# Patient Record
Sex: MALE | HISPANIC OR LATINO | Employment: FULL TIME | URBAN - METROPOLITAN AREA
[De-identification: names, ages, dates, MRNs, and addresses within clinical notes are randomized per-mention and may not be internally consistent; named-entity substitution may affect disease eponyms.]

---

## 2017-12-16 ENCOUNTER — APPOINTMENT (OUTPATIENT)
Dept: RADIOLOGY | Facility: MEDICAL CENTER | Age: 42
DRG: 854 | End: 2017-12-16
Attending: EMERGENCY MEDICINE

## 2017-12-16 ENCOUNTER — HOSPITAL ENCOUNTER (INPATIENT)
Facility: MEDICAL CENTER | Age: 42
LOS: 1 days | DRG: 854 | End: 2017-12-18
Attending: EMERGENCY MEDICINE | Admitting: HOSPITALIST

## 2017-12-16 DIAGNOSIS — K61.1 ABSCESS, PERIRECTAL: ICD-10-CM

## 2017-12-16 DIAGNOSIS — L03.317 CELLULITIS OF BUTTOCK: ICD-10-CM

## 2017-12-16 LAB
ALBUMIN SERPL BCP-MCNC: 3.9 G/DL (ref 3.2–4.9)
ALBUMIN/GLOB SERPL: 0.9 G/DL
ALP SERPL-CCNC: 139 U/L (ref 30–99)
ALT SERPL-CCNC: 55 U/L (ref 2–50)
AMORPH CRY #/AREA URNS HPF: PRESENT /HPF
ANION GAP SERPL CALC-SCNC: 11 MMOL/L (ref 0–11.9)
APPEARANCE UR: CLEAR
AST SERPL-CCNC: 76 U/L (ref 12–45)
BACTERIA #/AREA URNS HPF: NEGATIVE /HPF
BASOPHILS # BLD AUTO: 0.2 % (ref 0–1.8)
BASOPHILS # BLD: 0.03 K/UL (ref 0–0.12)
BILIRUB SERPL-MCNC: 1.2 MG/DL (ref 0.1–1.5)
BILIRUB UR QL STRIP.AUTO: NEGATIVE
BLOOD CULTURE HOLD CXBCH: NORMAL
BUN SERPL-MCNC: 10 MG/DL (ref 8–22)
CALCIUM SERPL-MCNC: 8.7 MG/DL (ref 8.5–10.5)
CHLORIDE SERPL-SCNC: 99 MMOL/L (ref 96–112)
CO2 SERPL-SCNC: 23 MMOL/L (ref 20–33)
COLOR UR: YELLOW
CREAT SERPL-MCNC: 1.08 MG/DL (ref 0.5–1.4)
EOSINOPHIL # BLD AUTO: 0.03 K/UL (ref 0–0.51)
EOSINOPHIL NFR BLD: 0.2 % (ref 0–6.9)
EPI CELLS #/AREA URNS HPF: NEGATIVE /HPF
ERYTHROCYTE [DISTWIDTH] IN BLOOD BY AUTOMATED COUNT: 43.8 FL (ref 35.9–50)
FLUAV RNA SPEC QL NAA+PROBE: NEGATIVE
FLUBV RNA SPEC QL NAA+PROBE: NEGATIVE
GFR SERPL CREATININE-BSD FRML MDRD: >60 ML/MIN/1.73 M 2
GLOBULIN SER CALC-MCNC: 4.4 G/DL (ref 1.9–3.5)
GLUCOSE SERPL-MCNC: 107 MG/DL (ref 65–99)
GLUCOSE UR STRIP.AUTO-MCNC: NEGATIVE MG/DL
HCT VFR BLD AUTO: 40.4 % (ref 42–52)
HGB BLD-MCNC: 13.5 G/DL (ref 14–18)
HYALINE CASTS #/AREA URNS LPF: ABNORMAL /LPF
IMM GRANULOCYTES # BLD AUTO: 0.02 K/UL (ref 0–0.11)
IMM GRANULOCYTES NFR BLD AUTO: 0.2 % (ref 0–0.9)
KETONES UR STRIP.AUTO-MCNC: NEGATIVE MG/DL
LACTATE BLD-SCNC: 1 MMOL/L (ref 0.5–2)
LACTATE BLD-SCNC: 2.5 MMOL/L (ref 0.5–2)
LEUKOCYTE ESTERASE UR QL STRIP.AUTO: ABNORMAL
LYMPHOCYTES # BLD AUTO: 2.42 K/UL (ref 1–4.8)
LYMPHOCYTES NFR BLD: 19.5 % (ref 22–41)
MCH RBC QN AUTO: 28.3 PG (ref 27–33)
MCHC RBC AUTO-ENTMCNC: 33.4 G/DL (ref 33.7–35.3)
MCV RBC AUTO: 84.7 FL (ref 81.4–97.8)
MICRO URNS: ABNORMAL
MONOCYTES # BLD AUTO: 1.05 K/UL (ref 0–0.85)
MONOCYTES NFR BLD AUTO: 8.5 % (ref 0–13.4)
NEUTROPHILS # BLD AUTO: 8.84 K/UL (ref 1.82–7.42)
NEUTROPHILS NFR BLD: 71.4 % (ref 44–72)
NITRITE UR QL STRIP.AUTO: NEGATIVE
NRBC # BLD AUTO: 0 K/UL
NRBC BLD AUTO-RTO: 0 /100 WBC
PH UR STRIP.AUTO: 7.5 [PH]
PLATELET # BLD AUTO: 195 K/UL (ref 164–446)
PMV BLD AUTO: 10.3 FL (ref 9–12.9)
POTASSIUM SERPL-SCNC: 3.5 MMOL/L (ref 3.6–5.5)
PROT SERPL-MCNC: 8.3 G/DL (ref 6–8.2)
PROT UR QL STRIP: NEGATIVE MG/DL
RBC # BLD AUTO: 4.77 M/UL (ref 4.7–6.1)
RBC # URNS HPF: ABNORMAL /HPF
RBC UR QL AUTO: NEGATIVE
SODIUM SERPL-SCNC: 133 MMOL/L (ref 135–145)
SP GR UR STRIP.AUTO: 1.01
UROBILINOGEN UR STRIP.AUTO-MCNC: 4 MG/DL
WBC # BLD AUTO: 12.4 K/UL (ref 4.8–10.8)
WBC #/AREA URNS HPF: ABNORMAL /HPF

## 2017-12-16 PROCEDURE — 87040 BLOOD CULTURE FOR BACTERIA: CPT

## 2017-12-16 PROCEDURE — 87502 INFLUENZA DNA AMP PROBE: CPT

## 2017-12-16 PROCEDURE — 85025 COMPLETE CBC W/AUTO DIFF WBC: CPT

## 2017-12-16 PROCEDURE — 304561 HCHG STAT O2

## 2017-12-16 PROCEDURE — 700111 HCHG RX REV CODE 636 W/ 250 OVERRIDE (IP): Performed by: EMERGENCY MEDICINE

## 2017-12-16 PROCEDURE — 700101 HCHG RX REV CODE 250

## 2017-12-16 PROCEDURE — 96375 TX/PRO/DX INJ NEW DRUG ADDON: CPT

## 2017-12-16 PROCEDURE — 81001 URINALYSIS AUTO W/SCOPE: CPT

## 2017-12-16 PROCEDURE — 80053 COMPREHEN METABOLIC PANEL: CPT

## 2017-12-16 PROCEDURE — 700105 HCHG RX REV CODE 258: Performed by: EMERGENCY MEDICINE

## 2017-12-16 PROCEDURE — 700111 HCHG RX REV CODE 636 W/ 250 OVERRIDE (IP)

## 2017-12-16 PROCEDURE — 83605 ASSAY OF LACTIC ACID: CPT

## 2017-12-16 PROCEDURE — A9270 NON-COVERED ITEM OR SERVICE: HCPCS | Performed by: EMERGENCY MEDICINE

## 2017-12-16 PROCEDURE — 71010 DX-CHEST-PORTABLE (1 VIEW): CPT

## 2017-12-16 PROCEDURE — 700102 HCHG RX REV CODE 250 W/ 637 OVERRIDE(OP): Performed by: EMERGENCY MEDICINE

## 2017-12-16 PROCEDURE — 96374 THER/PROPH/DIAG INJ IV PUSH: CPT

## 2017-12-16 PROCEDURE — 99285 EMERGENCY DEPT VISIT HI MDM: CPT

## 2017-12-16 RX ORDER — ACETAMINOPHEN 325 MG/1
1000 TABLET ORAL ONCE
Status: COMPLETED | OUTPATIENT
Start: 2017-12-16 | End: 2017-12-16

## 2017-12-16 RX ORDER — SODIUM CHLORIDE 9 MG/ML
2000 INJECTION, SOLUTION INTRAVENOUS
Status: COMPLETED | OUTPATIENT
Start: 2017-12-16 | End: 2017-12-16

## 2017-12-16 RX ORDER — SODIUM CHLORIDE 9 MG/ML
1000 INJECTION, SOLUTION INTRAVENOUS ONCE
Status: COMPLETED | OUTPATIENT
Start: 2017-12-16 | End: 2017-12-16

## 2017-12-16 RX ORDER — ONDANSETRON 2 MG/ML
4 INJECTION INTRAMUSCULAR; INTRAVENOUS ONCE
Status: COMPLETED | OUTPATIENT
Start: 2017-12-16 | End: 2017-12-16

## 2017-12-16 RX ORDER — CEFTRIAXONE 1 G/1
1 INJECTION, POWDER, FOR SOLUTION INTRAMUSCULAR; INTRAVENOUS ONCE
Status: COMPLETED | OUTPATIENT
Start: 2017-12-16 | End: 2017-12-16

## 2017-12-16 RX ADMIN — ONDANSETRON 4 MG: 2 INJECTION INTRAMUSCULAR; INTRAVENOUS at 19:57

## 2017-12-16 RX ADMIN — ACETAMINOPHEN 975 MG: 325 TABLET, FILM COATED ORAL at 19:57

## 2017-12-16 RX ADMIN — SODIUM CHLORIDE 1000 ML: 9 INJECTION, SOLUTION INTRAVENOUS at 19:57

## 2017-12-16 RX ADMIN — CEFTRIAXONE SODIUM 1 G: 1 INJECTION, POWDER, FOR SOLUTION INTRAMUSCULAR; INTRAVENOUS at 23:53

## 2017-12-16 RX ADMIN — SODIUM CHLORIDE 2000 ML: 9 INJECTION, SOLUTION INTRAVENOUS at 22:00

## 2017-12-16 ASSESSMENT — ENCOUNTER SYMPTOMS
NECK STIFFNESS: 0
VOMITING: 1
DIARRHEA: 0
ABDOMINAL PAIN: 1
BACK PAIN: 0
NAUSEA: 1
FEVER: 1
NECK PAIN: 0
DIZZINESS: 0
COUGH: 1

## 2017-12-16 ASSESSMENT — PAIN SCALES - GENERAL
PAINLEVEL_OUTOF10: 10
PAINLEVEL_OUTOF10: 9

## 2017-12-17 ENCOUNTER — RESOLUTE PROFESSIONAL BILLING HOSPITAL PROF FEE (OUTPATIENT)
Dept: HOSPITALIST | Facility: MEDICAL CENTER | Age: 42
End: 2017-12-17
Payer: COMMERCIAL

## 2017-12-17 ENCOUNTER — APPOINTMENT (OUTPATIENT)
Dept: RADIOLOGY | Facility: MEDICAL CENTER | Age: 42
DRG: 854 | End: 2017-12-17
Attending: EMERGENCY MEDICINE

## 2017-12-17 PROBLEM — A41.9 SEPSIS (HCC): Status: ACTIVE | Noted: 2017-12-17

## 2017-12-17 PROBLEM — K61.1 ABSCESS, PERIRECTAL: Status: ACTIVE | Noted: 2017-12-17

## 2017-12-17 PROBLEM — L03.317 CELLULITIS OF BUTTOCK: Status: ACTIVE | Noted: 2017-12-17

## 2017-12-17 LAB
GRAM STN SPEC: NORMAL
SIGNIFICANT IND 70042: NORMAL
SITE SITE: NORMAL
SOURCE SOURCE: NORMAL

## 2017-12-17 PROCEDURE — 99223 1ST HOSP IP/OBS HIGH 75: CPT | Performed by: HOSPITALIST

## 2017-12-17 PROCEDURE — 700105 HCHG RX REV CODE 258: Performed by: HOSPITALIST

## 2017-12-17 PROCEDURE — 160038 HCHG SURGERY MINUTES - EA ADDL 1 MIN LEVEL 2: Performed by: SURGERY

## 2017-12-17 PROCEDURE — 0JDB0ZZ EXTRACTION OF PERINEUM SUBCUTANEOUS TISSUE AND FASCIA, OPEN APPROACH: ICD-10-PCS | Performed by: SURGERY

## 2017-12-17 PROCEDURE — 160009 HCHG ANES TIME/MIN: Performed by: SURGERY

## 2017-12-17 PROCEDURE — 87040 BLOOD CULTURE FOR BACTERIA: CPT

## 2017-12-17 PROCEDURE — 160048 HCHG OR STATISTICAL LEVEL 1-5: Performed by: SURGERY

## 2017-12-17 PROCEDURE — 770006 HCHG ROOM/CARE - MED/SURG/GYN SEMI*

## 2017-12-17 PROCEDURE — 87070 CULTURE OTHR SPECIMN AEROBIC: CPT

## 2017-12-17 PROCEDURE — 160002 HCHG RECOVERY MINUTES (STAT): Performed by: SURGERY

## 2017-12-17 PROCEDURE — 700105 HCHG RX REV CODE 258: Performed by: PHARMACIST

## 2017-12-17 PROCEDURE — 700102 HCHG RX REV CODE 250 W/ 637 OVERRIDE(OP): Performed by: HOSPITALIST

## 2017-12-17 PROCEDURE — 160036 HCHG PACU - EA ADDL 30 MINS PHASE I: Performed by: SURGERY

## 2017-12-17 PROCEDURE — 87075 CULTR BACTERIA EXCEPT BLOOD: CPT

## 2017-12-17 PROCEDURE — 72192 CT PELVIS W/O DYE: CPT

## 2017-12-17 PROCEDURE — 160035 HCHG PACU - 1ST 60 MINS PHASE I: Performed by: SURGERY

## 2017-12-17 PROCEDURE — 36415 COLL VENOUS BLD VENIPUNCTURE: CPT

## 2017-12-17 PROCEDURE — A9270 NON-COVERED ITEM OR SERVICE: HCPCS | Performed by: SURGERY

## 2017-12-17 PROCEDURE — 700111 HCHG RX REV CODE 636 W/ 250 OVERRIDE (IP): Performed by: HOSPITALIST

## 2017-12-17 PROCEDURE — 700105 HCHG RX REV CODE 258

## 2017-12-17 PROCEDURE — 700101 HCHG RX REV CODE 250

## 2017-12-17 PROCEDURE — 87077 CULTURE AEROBIC IDENTIFY: CPT

## 2017-12-17 PROCEDURE — 500423 HCHG DRESSING, ABD COMBINE: Performed by: SURGERY

## 2017-12-17 PROCEDURE — 74177 CT ABD & PELVIS W/CONTRAST: CPT

## 2017-12-17 PROCEDURE — 500892 HCHG PACK, PERI-GYN: Performed by: SURGERY

## 2017-12-17 PROCEDURE — A9270 NON-COVERED ITEM OR SERVICE: HCPCS | Performed by: HOSPITALIST

## 2017-12-17 PROCEDURE — 700101 HCHG RX REV CODE 250: Performed by: HOSPITALIST

## 2017-12-17 PROCEDURE — A6266 IMPREG GAUZE NO H20/SAL/YARD: HCPCS | Performed by: SURGERY

## 2017-12-17 PROCEDURE — 700102 HCHG RX REV CODE 250 W/ 637 OVERRIDE(OP): Performed by: SURGERY

## 2017-12-17 PROCEDURE — 87205 SMEAR GRAM STAIN: CPT

## 2017-12-17 PROCEDURE — 700111 HCHG RX REV CODE 636 W/ 250 OVERRIDE (IP): Performed by: PHARMACIST

## 2017-12-17 PROCEDURE — 700111 HCHG RX REV CODE 636 W/ 250 OVERRIDE (IP)

## 2017-12-17 PROCEDURE — 160027 HCHG SURGERY MINUTES - 1ST 30 MINS LEVEL 2: Performed by: SURGERY

## 2017-12-17 RX ORDER — DEXAMETHASONE SODIUM PHOSPHATE 4 MG/ML
4 INJECTION, SOLUTION INTRA-ARTICULAR; INTRALESIONAL; INTRAMUSCULAR; INTRAVENOUS; SOFT TISSUE
Status: DISCONTINUED | OUTPATIENT
Start: 2017-12-17 | End: 2017-12-18 | Stop reason: HOSPADM

## 2017-12-17 RX ORDER — ACETAMINOPHEN 325 MG/1
650 TABLET ORAL EVERY 6 HOURS PRN
Status: DISCONTINUED | OUTPATIENT
Start: 2017-12-17 | End: 2017-12-18 | Stop reason: HOSPADM

## 2017-12-17 RX ORDER — SODIUM CHLORIDE 9 MG/ML
INJECTION, SOLUTION INTRAVENOUS
Status: COMPLETED
Start: 2017-12-17 | End: 2017-12-17

## 2017-12-17 RX ORDER — OXYCODONE HYDROCHLORIDE 5 MG/1
5 TABLET ORAL
Status: DISCONTINUED | OUTPATIENT
Start: 2017-12-17 | End: 2017-12-18 | Stop reason: HOSPADM

## 2017-12-17 RX ORDER — AMOXICILLIN 250 MG
2 CAPSULE ORAL 2 TIMES DAILY
Status: DISCONTINUED | OUTPATIENT
Start: 2017-12-17 | End: 2017-12-18 | Stop reason: HOSPADM

## 2017-12-17 RX ORDER — PROMETHAZINE HYDROCHLORIDE 25 MG/1
12.5-25 SUPPOSITORY RECTAL EVERY 4 HOURS PRN
Status: DISCONTINUED | OUTPATIENT
Start: 2017-12-17 | End: 2017-12-18 | Stop reason: HOSPADM

## 2017-12-17 RX ORDER — IBUPROFEN 800 MG/1
800 TABLET ORAL
Status: DISCONTINUED | OUTPATIENT
Start: 2017-12-17 | End: 2017-12-18 | Stop reason: HOSPADM

## 2017-12-17 RX ORDER — PROMETHAZINE HYDROCHLORIDE 25 MG/1
12.5-25 TABLET ORAL EVERY 4 HOURS PRN
Status: DISCONTINUED | OUTPATIENT
Start: 2017-12-17 | End: 2017-12-18 | Stop reason: HOSPADM

## 2017-12-17 RX ORDER — ACETAMINOPHEN 500 MG
1000 TABLET ORAL EVERY 6 HOURS
Status: DISCONTINUED | OUTPATIENT
Start: 2017-12-17 | End: 2017-12-18 | Stop reason: HOSPADM

## 2017-12-17 RX ORDER — ONDANSETRON 2 MG/ML
4 INJECTION INTRAMUSCULAR; INTRAVENOUS EVERY 4 HOURS PRN
Status: DISCONTINUED | OUTPATIENT
Start: 2017-12-17 | End: 2017-12-17

## 2017-12-17 RX ORDER — ONDANSETRON 2 MG/ML
4 INJECTION INTRAMUSCULAR; INTRAVENOUS EVERY 4 HOURS PRN
Status: DISCONTINUED | OUTPATIENT
Start: 2017-12-17 | End: 2017-12-18 | Stop reason: HOSPADM

## 2017-12-17 RX ORDER — SCOLOPAMINE TRANSDERMAL SYSTEM 1 MG/1
1 PATCH, EXTENDED RELEASE TRANSDERMAL
Status: DISCONTINUED | OUTPATIENT
Start: 2017-12-17 | End: 2017-12-18 | Stop reason: HOSPADM

## 2017-12-17 RX ORDER — POLYETHYLENE GLYCOL 3350 17 G/17G
1 POWDER, FOR SOLUTION ORAL
Status: DISCONTINUED | OUTPATIENT
Start: 2017-12-17 | End: 2017-12-18 | Stop reason: HOSPADM

## 2017-12-17 RX ORDER — HALOPERIDOL 5 MG/ML
1 INJECTION INTRAMUSCULAR EVERY 6 HOURS PRN
Status: DISCONTINUED | OUTPATIENT
Start: 2017-12-17 | End: 2017-12-18 | Stop reason: HOSPADM

## 2017-12-17 RX ORDER — MORPHINE SULFATE 4 MG/ML
2 INJECTION, SOLUTION INTRAMUSCULAR; INTRAVENOUS
Status: DISCONTINUED | OUTPATIENT
Start: 2017-12-17 | End: 2017-12-18 | Stop reason: HOSPADM

## 2017-12-17 RX ORDER — BISACODYL 10 MG
10 SUPPOSITORY, RECTAL RECTAL
Status: DISCONTINUED | OUTPATIENT
Start: 2017-12-17 | End: 2017-12-18 | Stop reason: HOSPADM

## 2017-12-17 RX ORDER — SODIUM CHLORIDE AND POTASSIUM CHLORIDE 150; 900 MG/100ML; MG/100ML
INJECTION, SOLUTION INTRAVENOUS CONTINUOUS
Status: DISCONTINUED | OUTPATIENT
Start: 2017-12-17 | End: 2017-12-18 | Stop reason: HOSPADM

## 2017-12-17 RX ORDER — ONDANSETRON 4 MG/1
4 TABLET, ORALLY DISINTEGRATING ORAL EVERY 4 HOURS PRN
Status: DISCONTINUED | OUTPATIENT
Start: 2017-12-17 | End: 2017-12-18 | Stop reason: HOSPADM

## 2017-12-17 RX ORDER — DIPHENHYDRAMINE HYDROCHLORIDE 50 MG/ML
25 INJECTION INTRAMUSCULAR; INTRAVENOUS EVERY 6 HOURS PRN
Status: DISCONTINUED | OUTPATIENT
Start: 2017-12-17 | End: 2017-12-18 | Stop reason: HOSPADM

## 2017-12-17 RX ORDER — OXYCODONE HYDROCHLORIDE 5 MG/1
2.5 TABLET ORAL
Status: DISCONTINUED | OUTPATIENT
Start: 2017-12-17 | End: 2017-12-18 | Stop reason: HOSPADM

## 2017-12-17 RX ADMIN — ACETAMINOPHEN 1000 MG: 500 TABLET, FILM COATED ORAL at 15:24

## 2017-12-17 RX ADMIN — SODIUM CHLORIDE 1000 ML: 9 INJECTION, SOLUTION INTRAVENOUS at 05:33

## 2017-12-17 RX ADMIN — SODIUM CHLORIDE: 9 INJECTION, SOLUTION INTRAVENOUS at 05:28

## 2017-12-17 RX ADMIN — VANCOMYCIN HYDROCHLORIDE 2000 MG: 100 INJECTION, POWDER, LYOPHILIZED, FOR SOLUTION INTRAVENOUS at 04:53

## 2017-12-17 RX ADMIN — IBUPROFEN 800 MG: 800 TABLET, FILM COATED ORAL at 17:56

## 2017-12-17 RX ADMIN — OXYCODONE HYDROCHLORIDE 5 MG: 5 TABLET ORAL at 03:28

## 2017-12-17 RX ADMIN — MORPHINE SULFATE 2 MG: 4 INJECTION INTRAVENOUS at 09:17

## 2017-12-17 RX ADMIN — POTASSIUM CHLORIDE AND SODIUM CHLORIDE: 900; 150 INJECTION, SOLUTION INTRAVENOUS at 17:55

## 2017-12-17 RX ADMIN — POTASSIUM CHLORIDE AND SODIUM CHLORIDE: 900; 150 INJECTION, SOLUTION INTRAVENOUS at 04:52

## 2017-12-17 RX ADMIN — VANCOMYCIN HYDROCHLORIDE 800 MG: 100 INJECTION, POWDER, LYOPHILIZED, FOR SOLUTION INTRAVENOUS at 21:58

## 2017-12-17 RX ADMIN — VANCOMYCIN HYDROCHLORIDE 800 MG: 100 INJECTION, POWDER, LYOPHILIZED, FOR SOLUTION INTRAVENOUS at 15:24

## 2017-12-17 RX ADMIN — STANDARDIZED SENNA CONCENTRATE AND DOCUSATE SODIUM 2 TABLET: 8.6; 5 TABLET, FILM COATED ORAL at 21:58

## 2017-12-17 RX ADMIN — TAZOBACTAM SODIUM AND PIPERACILLIN SODIUM 3.38 G: 375; 3 INJECTION, SOLUTION INTRAVENOUS at 05:05

## 2017-12-17 RX ADMIN — ACETAMINOPHEN 1000 MG: 500 TABLET, FILM COATED ORAL at 21:58

## 2017-12-17 ASSESSMENT — PAIN SCALES - GENERAL
PAINLEVEL_OUTOF10: 4
PAINLEVEL_OUTOF10: 0
PAINLEVEL_OUTOF10: 4
PAINLEVEL_OUTOF10: 4
PAINLEVEL_OUTOF10: 5
PAINLEVEL_OUTOF10: 0
PAINLEVEL_OUTOF10: 6
PAINLEVEL_OUTOF10: 5
PAINLEVEL_OUTOF10: 0
PAINLEVEL_OUTOF10: 4
PAINLEVEL_OUTOF10: 5

## 2017-12-17 ASSESSMENT — PATIENT HEALTH QUESTIONNAIRE - PHQ9
SUM OF ALL RESPONSES TO PHQ9 QUESTIONS 1 AND 2: 0
1. LITTLE INTEREST OR PLEASURE IN DOING THINGS: NOT AT ALL
SUM OF ALL RESPONSES TO PHQ QUESTIONS 1-9: 0
2. FEELING DOWN, DEPRESSED, IRRITABLE, OR HOPELESS: NOT AT ALL

## 2017-12-17 ASSESSMENT — LIFESTYLE VARIABLES
ALCOHOL_USE: NO
EVER_SMOKED: NEVER

## 2017-12-17 NOTE — CARE PLAN
Problem: Safety  Goal: Will remain free from injury  Outcome: PROGRESSING AS EXPECTED  Patient educated to call for assistance before getting out of bed, call light within reach.  Bed in lowest position.

## 2017-12-17 NOTE — PROGRESS NOTES
2 RN skin check completed. Swelling in the perianal area. Callouses on bilateral feet and right knee. Swelling in BLE +1

## 2017-12-17 NOTE — ED NOTES
Pt w/c to Red 2. Pt changed into gown and placed on monitor.  Agree with triage note. Rash noted to right buttocks. Pt tearful. Pt's family at bedside.  Chart up and ready for ERP now.

## 2017-12-17 NOTE — OR NURSING
Pt sleeping btwn care however wakes easily to verbal stimulation. Pt reports pain level a 4/10 which he states is tolerable. VSS on 2 LNC. Wound with packing and 4x4 gauze. Gauze saturated with serosanguinous drainage. Drsg changed. Mesh underwear in place.  ABX infusing per orders.   Report called to receiving RN. Waiting for transport.

## 2017-12-17 NOTE — CONSULTS
Surgical Consultation    Date: 12/17/2017    Requesting Physician: Dr. Beckie Madison  PCP: Pcp Pt States None  Attending Physician: Supa Clayton M.D.    CC: Perineal tenderness and swelling    HPI: This is a 42 y.o. male who is presenting with 3 days of progressive pain and swelling in his perineal region, between his scrotum and the anal opening. He does not remember any inciting factors.  He reports having pain and swelling on his left thigh, and on his back which he says was similar, however these areas no longer have symptoms. He has not had any drainage or foul odor from the perineal area. He denies pain with defecation, hematochezia, melena, change in his bowel function. He reports that he is regular with his bowel movements. He has neither constipation or diarrhea. He has had some mild nausea associated with the pain. He also complains of some mild fever subjectively and had a fever to 101.8 on arrival to the emergency department. He has no past medical or surgical history. He says he does not smoke or drink alcohol.     History reviewed. No pertinent past medical history.    History reviewed. No pertinent surgical history.    No current facility-administered medications for this encounter.      No current outpatient prescriptions on file.       Social History     Social History   • Marital status:      Spouse name: N/A   • Number of children: N/A   • Years of education: N/A     Occupational History   • Not on file.     Social History Main Topics   • Smoking status: Never Smoker   • Smokeless tobacco: Never Used   • Alcohol use No   • Drug use: No   • Sexual activity: Not on file     Other Topics Concern   • Not on file     Social History Narrative   • No narrative on file       History reviewed. No pertinent family history.    Allergies:  Patient has no known allergies.    Review of Systems:  Negative except as noted above in HPI on 10 point review    Physical Exam:  Blood pressure 123/85, pulse 86,  "temperature 37.1 °C (98.7 °F), resp. rate 15, height 1.626 m (5' 4\"), weight 81.4 kg (179 lb 7.3 oz), SpO2 99 %.    Constitutional: he is oriented to person, place, and time.  he appears well-developed and well-nourished. No distress.   Head: Normocephalic and atraumatic.   Neck: Normal range of motion. Neck supple. No JVD present. No tracheal deviation present. No thyromegaly present.   Cardiovascular: Normal rate, regular rhythm, normal heart sounds and intact distal pulses.  Exam reveals no gallop and no friction rub.  No murmur heard.  Pulmonary/Chest: Effort normal and breath sounds normal. No stridor. No respiratory distress. he has no wheezes.  Abdominal: Soft, mild left flank tenderness to palpation, nondistended but protuberant. There is no rebound and no guarding.   Perineum: There is swelling and the right perineal region between the scrotum and the anal verge which extends onto the right gluteal fold. There is no palpable fluctuance, but the area is quite tender. No significant erythema.  Musculoskeletal: Normal range of motion. he exhibits no edema and no tenderness.   Neurological: he is alert and oriented to person, place, and time. he has normal reflexes. No cranial nerve deficit. Coordination normal.   Skin: Skin is warm and dry. No rash noted. he is not diaphoretic. No erythema. No pallor.   Psychiatric: he has a normal mood and affect.  Behavior is normal.       Labs:  Recent Labs      12/16/17 1950   WBC  12.4*   RBC  4.77   HEMOGLOBIN  13.5*   HEMATOCRIT  40.4*   MCV  84.7   MCH  28.3   MCHC  33.4*   RDW  43.8   PLATELETCT  195   MPV  10.3     Recent Labs      12/16/17 1950   SODIUM  133*   POTASSIUM  3.5*   CHLORIDE  99   CO2  23   GLUCOSE  107*   BUN  10   CREATININE  1.08   CALCIUM  8.7         Recent Labs      12/16/17 1950   ASTSGOT  76*   ALTSGPT  55*   TBILIRUBIN  1.2   ALKPHOSPHAT  139*   GLOBULIN  4.4*       Radiology:  CT-ABDOMEN-PELVIS WITH   Final Result         1. No acute " abnormality identified in the abdomen or pelvis.      2. Absent right kidney. Hypertrophic left kidney.      DX-CHEST-PORTABLE (1 VIEW)   Final Result         1. No acute cardiopulmonary abnormalities are identified.          Assessment: This is a 42 y.o.Male with perineal swelling and cellulitis. A CT was performed which was read as negative for abscess, however I do not believe that it extends distally enough to fully evaluate the area in question the perineum.     Recommendations:   -On the existing images there appears to be an area of inflammation which may be associated with early abscess formation, however the scan needs to be re-done to extend further distal. This was communicated with Dr. Madison in the emergency department and she is really ordering the CT now  -The patient is being admitted to the medical service  -Recommend broad-spectrum antibiotics  -I will follow along and will evaluate the repeat CT scan as well as the patient's physical examination to determine if I&D as necessary.      Thank you very much for this consultation.    Supa Clayton M.D.  [facility]  [phone]

## 2017-12-17 NOTE — OR SURGEON
Immediate Post OP Note    PreOp Diagnosis:   1. Right Perianal abscess    PostOp Diagnosis: same    Procedure(s):  BRITTANY ANAL ABSCESS INCISION AND DRAINAGE - Wound Class: Contaminated    Surgeon(s):  Supa Clayton M.D.    Anesthesiologist/Type of Anesthesia:  Anesthesiologist: aJsper Kerns M.D./General    Surgical Staff:  Circulator: Laura Gama R.N.  Relief Scrub: Ryan CORDOVA Donor  Scrub Person: Wilmer Griffin    Specimens:  Deep wound culture swab for micro    Estimated Blood Loss: 50mL    Findings: large R perianal abscess full of pus and necrotic debris. ~5cm from anal verge. No extension into anal canal. 10x8x6 cm    Complications: None    Outcome: Transferred to PACU in stable condition      12/17/2017 1:09 PM Supa Clayton

## 2017-12-17 NOTE — H&P
DATE OF ADMISSION:  12/17/2017    PRIMARY CARE PHYSICIAN:  None.    CHIEF COMPLAINT:  Pain in his right buttocks.    HISTORY OF PRESENT ILLNESS:  A 42-year-old male, he has no known medical   history except for history of urticaria.  Patient has been developing   progressive pain at his right buttock near the perineal area for about 3 days,   swelling has increased, has severe pain, no radiation.  He has had fevers   related to this.  He did break out in hives, which is not unusual for him, the   hives have resolved.  No nausea or vomiting.  No diarrhea.  No constipation.    No drainage from the area.    REVIEW OF SYSTEMS:  Comprehensive review of systems was performed.  All   pertinent positives negative described in the HPI.  All other systems reviewed   and are negative.    PAST MEDICAL HISTORY:  None.    SOCIAL HISTORY:  He is a lifetime nonsmoker.  He does not drink alcohol.  He   denies drug use.    FAMILY HISTORY:  Patient's parents are healthy.  He does not know of any   medical diseases in first-degree relatives.    ALLERGIES:  No known drug allergies.    MEDICATIONS:  None.    PHYSICAL EXAMINATION:  VITAL SIGNS:  Temperature 37.1, blood pressure 123/85, pulse 85, respirations   18, saturating 94% on 2 L by nasal cannula.  GENERAL:  The patient is well developed, well nourished, in no apparent   distress.  HEENT:  Pupils are equally round and reactive.  Extraocular movements are   intact.  Anicteric sclerae.  NECK:  Supple.  No lymphadenopathy.  No thyromegaly.  CARDIOVASCULAR:  Regular rate and rhythm.  No murmurs, rubs or gallops.  PMI   is nondisplaced.  RESPIRATORY:  Clear to auscultation bilaterally.  No wheezing.  No crackles.  ABDOMEN:  Soft, nontender, nondistended.  No rebound or guarding.  EXTREMITIES:  No clubbing, no cyanosis, no edema.  At the right buttock, there   is an area of induration approximately 6 cm in diameter.  It is firm.  No   fluctuance noted.  It does not extend into the  scrotum.    LABORATORY DATA:  White blood cell count 12.4, hemoglobin 13.5, platelets 195.    Sodium 133, potassium 3.5, BUN 10, creatinine 1.08.  CT scan, no acute   abnormality identified in the abdomen or pelvis, absent right kidney,   hypertrophic left kidney.    ASSESSMENT AND PLAN:  A 42-year-old male, presents with soft tissue infection,   right buttock.  1.  Soft tissue infection, area is indurated.  Certainly, there is some   concern that this extends its potential space in the perineum or buttock   region, the patient does have systemic symptoms of fever and chills.  I   appreciate surgical consultation from Dr. Clayton.  CT scan is pending at this   time to further evaluate this area.  Patient was started on broad spectrum   Zosyn and vancomycin for now, admitted to inpatient status.  He requires IV   antibiotics, risk of decompensation per the infection is high.  2.  Lactic acidosis.  Lactic acidosis has resolved with IV fluids here in the   emergency room.  Patient does not meet criteria for sepsis as he does not have   2 positive SIRS criteria.  3.  Hyponatremia, likely hypovolemic hyponatremia, replete with IV fluids.  4.  Hypokalemia, replete with IV fluids.  5.  Prophylaxis, sequential compression devices.  6.  Full code.    Case discussed with emergency physician, Dr. Beckie Madison.  I expect patient   to remain in the hospital for greater than 2 midnights.       ____________________________________     MD TIM ADAMS / NTS    DD:  12/17/2017 02:42:10  DT:  12/17/2017 04:27:53    D#:  [id number]  Job#:  [id number]

## 2017-12-17 NOTE — PROGRESS NOTES
"Pharmacy Kinetics 42 y.o. male on vancomycin day # 1 2017    Currently on Vancomycin New Start 2000 mg IV x 1 (25 mg/kg load)  Other antibiotics: Zosyn continuous infusion     Indication for Treatment: SSTI    Pertinent history per medical record: Admitted on 2017 with 3 days of progressive pain and swelling between his scrotum and anal opening and fever of 101.8 upon arrival to ED.  Patient does report that he previously had pain and swelling on his left thigh/back; however, these areas are no longer causing symptoms.  Surgery has been consulted and per their note, CT was negative for abscess but have ordered a repeat CT that will extend more distal.  Broad spectrum antibiotics initiated for SSTI of peritoneal area with follow-up CT ordered to help determine if I&D is necessary.      Allergies: Patient has no known allergies.     List concerns for renal function: one kidney on CT scan, obese (BMI 30.8)    Pertinent cultures to date:   17 - Peripheral BC x 2: in process     Recent Labs      17   1950   WBC  12.4*   NEUTSPOLYS  71.40     Recent Labs      17   1950   BUN  10   CREATININE  1.08   ALBUMIN  3.9     No results for input(s): VANCOTROUGH, VANCOPEAK, VANCORANDOM in the last 72 hours.No intake or output data in the 24 hours ending 17 0321   Blood pressure 123/85, pulse 95, temperature 37.1 °C (98.7 °F), resp. rate 13, height 1.626 m (5' 4\"), weight 81.4 kg (179 lb 7.3 oz), SpO2 97 %. Temp (24hrs), Av.8 °C (100.1 °F), Min:37.1 °C (98.7 °F), Max:38.8 °C (101.8 °F)      A/P   1. Vancomycin dose change: 800 mg IV q8h (0600/1400/2200)  2. Next vancomycin level:  at 1330, prior to 4th dose (ordered)   3. Goal trough: 12-16 mcg/mL   4. Comments: Patient started on empiric antibiotics for SSTI of peritoneal area with some concern for early abscess formation per general surgeon's note.  Plan is for follow-up CT to help determine if I&D is necessary.  Blood cultures were " collected in ED.  Patient has only one kidney per CT imaging and due to BMI might have reduced dosing requirements over the next few days.  Will start patient on 10 mg/kg q8h.  Plan for steady state level as above or sooner if decline in renal function.      Zaynab Chilel, PharmD, BCPS

## 2017-12-17 NOTE — ED NOTES
Pt to triage, pt presents with c/o pain all over, as well as full body rash, + uritcaria x 3 days, pt with photos on cell phone to entire body, pt is febrile / tachycardic

## 2017-12-17 NOTE — CARE PLAN
Problem: Pain Management  Goal: Pain level will decrease to patient's comfort goal  Outcome: PROGRESSING AS EXPECTED  Patient having complaints of pain in buttock, 6/10.  Declines medication, repositioned for comfort.

## 2017-12-17 NOTE — PROGRESS NOTES
Pt received from ED [facility] MARTINEZ.  A/Ox4, slight fever 100.2, slightly tachy mid-high 90s, bu otherwise VSS.  Pt c/o perineal and buttock pain 5/10 but is refusing pain medication, repositioning and ice applied.  Swelling and skin warm in perineal area.  No nausea right now, but states nausea comes with more severe pain.  IV bolus completed, IVF and IV abx infusing to PIV, patent.  2L NC, satting >90%, clear TAF.  +normoactive BSx4, +flatus, no BM this shift, but pta in ED.  Oriented to unit.  POC discussed, all questions answered.  Call light within reach, calls appropriately.  Bed in low and locked position.

## 2017-12-17 NOTE — PROGRESS NOTES
Received report from evening RN.  Assumed care of patient.  Patient A&Ox4.  C/O pain 5/10 in buttock, repositioned per MAR.  Declining pain medication at this time. Dr. Clayton at bedside.  Orders to have patient sign consent for Incision and debridement of niyah-anal abscess.  Patient NPO for surgery.  Antibiotics infusing per MAR.  Patient up with SBA.  +void.  Plan of care discussed.  Call light within reach.  Bed in lowest position.

## 2017-12-17 NOTE — OP REPORT
Operative Note  12/17/2017    PreOp Diagnosis:   1. Right Perianal abscess     PostOp Diagnosis: same     Procedure(s):  BRITTANY ANAL ABSCESS INCISION AND DRAINAGE - Wound Class: Contaminated     Surgeon(s):  Supa Clayton M.D.     Anesthesiologist/Type of Anesthesia:  Anesthesiologist: Jasper Kerns M.D./General     Surgical Staff:  Circulator: Laura Gama R.N.  Relief Scrub: Ryan CORDOVA Donor  Scrub Person: Wilmer Griffin     Specimens:  Deep wound culture swab for micro     Estimated Blood Loss: 50mL     Findings: large R perianal abscess full of pus and necrotic debris. ~5cm from anal verge. No extension into anal canal. 10x8x6 cm     Complications: None     Outcome: Transferred to PACU in stable condition    Indications:  42-year-old male presents with fever and 3 days of right perianal pain. CT showing abscess gas locules. Incision and drainage discussed. The procedure, risks, benefits, alternatives were discussed with the patient wished to proceed.    Procedure in detail:  The patient was brought to the operative and placed in the operating room in supine position. Gen. and anesthesia was induced with a LMA. Patient was then placed in lithotomy position. And a sterile prep and drape was performed. Time out was performed. A rectal examination was performed and did not note any extensions of the abscess into the anal canal. A 3 cm elliptical portion of skin was removed from the most fluctuant portion of the right perineal area anterior to the anus at approximately 10:00 position. There is immediate egress of a significant amount of purulence and necrotic debris. Culture swab of this was obtained and sent for microbiology. A Mary clamp was used to open all loculations and drained the liquid debris. The pulse  was used to instill 3 L of saline. The wound was then packed with half-inch iodoform covered with gauze. Mesh underwear were used to keep this dressing in place. The patient was then  allowed to emerge from general anesthesia and was taken to the PAC in stable condition.    Supa Clayton M.D.  [facility]  [phone]

## 2017-12-17 NOTE — ED PROVIDER NOTES
"ED Provider Note    ED Provider Note          CHIEF COMPLAINT  Chief Complaint   Patient presents with   • Rash   • Body Aches   • Pain       HPI  Jass Feldman is a 42 y.o. male who presents to the Emergency DepartmentFor concern of body aches, fevers and a rash. He said he broke out in a rash on Friday and has pictures of it that appears like urticaria. It was diffuse over his body. He denies any exposures, new medicines or household products. He does work as a  and does not think he got exposed to anything at work. He then started to not feel well overall with body aches over the last couple days. The rash has since resolved. He says he is a little bit of a cough and some epigastric pain with some nausea and vomiting. No diarrhea. His pain in his abdomen is just diffusely in the center. No sick contacts.    REVIEW OF SYSTEMS  Review of Systems   Constitutional: Positive for fever.   HENT: Negative for congestion.    Respiratory: Positive for cough.    Gastrointestinal: Positive for abdominal pain, nausea and vomiting. Negative for diarrhea.   Genitourinary: Negative for difficulty urinating.   Musculoskeletal: Negative for back pain, neck pain and neck stiffness.   Skin: Positive for rash.   Allergic/Immunologic: Negative for immunocompromised state.   Neurological: Negative for dizziness.       PAST MEDICAL HISTORY       SURGICAL HISTORY  patient denies any surgical history    SOCIAL HISTORY  Social History   Substance Use Topics   • Smoking status: Never Smoker   • Smokeless tobacco: Never Used   • Alcohol use No      History   Drug Use No       FAMILY HISTORY  History reviewed. No pertinent family history.    CURRENT MEDICATIONS  Reviewed.  See Encounter Summary.     ALLERGIES  No Known Allergies    PHYSICAL EXAM  VITAL SIGNS: /85   Pulse 86   Temp 37.1 °C (98.7 °F)   Resp 15   Ht 1.626 m (5' 4\")   Wt 81.4 kg (179 lb 7.3 oz)   SpO2 99%   BMI 30.80 kg/m²   Physical Exam "   Constitutional: He is oriented to person, place, and time. No distress.   HENT:   Head: Normocephalic and atraumatic.   Eyes: Conjunctivae are normal. Pupils are equal, round, and reactive to light.   Neck: Normal range of motion.   Cardiovascular: Normal rate.    Pulmonary/Chest: Effort normal.   Abdominal: Soft. He exhibits no distension. There is tenderness in the periumbilical area. There is no rigidity, no rebound and no guarding.   Musculoskeletal: Normal range of motion.   Neurological: He is alert and oriented to person, place, and time.   Skin: Skin is warm. He is not diaphoretic.   Psychiatric: He has a normal mood and affect.           DIAGNOSTIC STUDIES / PROCEDURES     LABS  Labs Reviewed   CBC WITH DIFFERENTIAL - Abnormal; Notable for the following:        Result Value    WBC 12.4 (*)     Hemoglobin 13.5 (*)     Hematocrit 40.4 (*)     MCHC 33.4 (*)     Lymphocytes 19.50 (*)     Neutrophils (Absolute) 8.84 (*)     Monos (Absolute) 1.05 (*)     All other components within normal limits   COMP METABOLIC PANEL - Abnormal; Notable for the following:     Sodium 133 (*)     Potassium 3.5 (*)     Glucose 107 (*)     AST(SGOT) 76 (*)     ALT(SGPT) 55 (*)     Alkaline Phosphatase 139 (*)     Total Protein 8.3 (*)     Globulin 4.4 (*)     All other components within normal limits   LACTIC ACID - Abnormal; Notable for the following:     Lactic Acid 2.5 (*)     All other components within normal limits   URINALYSIS - Abnormal; Notable for the following:     Leukocyte Esterase Trace (*)     All other components within normal limits   URINE MICROSCOPIC (W/UA) - Abnormal; Notable for the following:     WBC 0-2 (*)     RBC 2-5 (*)     All other components within normal limits   LACTIC ACID   INFLUENZA A/B BY PCR   BLOOD CULTURE,HOLD   ESTIMATED GFR       All labs were reviewed by me.      RADIOLOGY  CT-ABDOMEN-PELVIS WITH   Final Result         1. No acute abnormality identified in the abdomen or pelvis.      2.  Absent right kidney. Hypertrophic left kidney.      DX-CHEST-PORTABLE (1 VIEW)   Final Result         1. No acute cardiopulmonary abnormalities are identified.        The radiologist's interpretation of all radiological studies have been reviewed by me.    COURSE & MEDICAL DECISION MAKING  Pertinent Labs & Imaging studies reviewed. (See chart for details)    8:07 PM - Patient seen and examined at bedside.     Differential Diagnosis: cellulitis, anaphylaxis, urticaria, EM, abscess, Alberto's gangrene    Decision Making:  This is a 42 y.o. year old male who presents withConcern of, fever and body aches as well as a red area of his skin. He presents here febrile but in no distress. He does not appear toxic. On inspection he has a nontender abdomen and his diffuse rash that he had pictures of earlier has completely resolved. He had one area of skin changes on the right side of his groin. This area was firm without any fluctuance however did have erythema. CT scan of the pelvis was done which showed no abscess or anything drainable. He has no crepitus on exam either to suggest a Alberto's gangrene. I did have general surgery evaluate the patient and agree with IV antibiotics and cleared there is nothing drainable at this time. Patient will be admitted to medicine for treatment of cellulitis at this time.    DISPOSITION:  Admitted     FINAL IMPRESSION  1. Cellulitis of buttock

## 2017-12-17 NOTE — PROGRESS NOTES
"Patient arrived on unit from PACU.  Assumed care of patient.  Patient A&Ox4.  Patient resting comfortably in bed, no complaints of pain at this time.  No complaints of nausea or vomiting.  Requesting to eat, states he is \"very hungry.\" Patient placed on regular diet, per order.  Patient has guaze in place at surgical site with mesh undergarments.  Plan of care discussed.  Call light within reach.  Bed in lowest position.    "

## 2017-12-18 VITALS
RESPIRATION RATE: 16 BRPM | HEART RATE: 54 BPM | WEIGHT: 179.45 LBS | SYSTOLIC BLOOD PRESSURE: 99 MMHG | TEMPERATURE: 97.6 F | BODY MASS INDEX: 30.64 KG/M2 | HEIGHT: 64 IN | OXYGEN SATURATION: 98 % | DIASTOLIC BLOOD PRESSURE: 65 MMHG

## 2017-12-18 PROBLEM — K61.1 ABSCESS, PERIRECTAL: Status: RESOLVED | Noted: 2017-12-17 | Resolved: 2017-12-18

## 2017-12-18 PROBLEM — A41.9 SEPSIS (HCC): Status: RESOLVED | Noted: 2017-12-17 | Resolved: 2017-12-18

## 2017-12-18 LAB
ANION GAP SERPL CALC-SCNC: 7 MMOL/L (ref 0–11.9)
BASOPHILS # BLD AUTO: 0.2 % (ref 0–1.8)
BASOPHILS # BLD: 0.02 K/UL (ref 0–0.12)
BUN SERPL-MCNC: 11 MG/DL (ref 8–22)
CALCIUM SERPL-MCNC: 8.1 MG/DL (ref 8.5–10.5)
CHLORIDE SERPL-SCNC: 108 MMOL/L (ref 96–112)
CO2 SERPL-SCNC: 20 MMOL/L (ref 20–33)
CREAT SERPL-MCNC: 0.71 MG/DL (ref 0.5–1.4)
EOSINOPHIL # BLD AUTO: 0 K/UL (ref 0–0.51)
EOSINOPHIL NFR BLD: 0 % (ref 0–6.9)
ERYTHROCYTE [DISTWIDTH] IN BLOOD BY AUTOMATED COUNT: 48.9 FL (ref 35.9–50)
GFR SERPL CREATININE-BSD FRML MDRD: >60 ML/MIN/1.73 M 2
GLUCOSE SERPL-MCNC: 142 MG/DL (ref 65–99)
HCT VFR BLD AUTO: 35.6 % (ref 42–52)
HGB BLD-MCNC: 11.5 G/DL (ref 14–18)
IMM GRANULOCYTES # BLD AUTO: 0.11 K/UL (ref 0–0.11)
IMM GRANULOCYTES NFR BLD AUTO: 0.8 % (ref 0–0.9)
LYMPHOCYTES # BLD AUTO: 1.28 K/UL (ref 1–4.8)
LYMPHOCYTES NFR BLD: 9.6 % (ref 22–41)
MCH RBC QN AUTO: 28.6 PG (ref 27–33)
MCHC RBC AUTO-ENTMCNC: 32.3 G/DL (ref 33.7–35.3)
MCV RBC AUTO: 88.6 FL (ref 81.4–97.8)
MONOCYTES # BLD AUTO: 0.53 K/UL (ref 0–0.85)
MONOCYTES NFR BLD AUTO: 4 % (ref 0–13.4)
NEUTROPHILS # BLD AUTO: 11.36 K/UL (ref 1.82–7.42)
NEUTROPHILS NFR BLD: 85.4 % (ref 44–72)
NRBC # BLD AUTO: 0 K/UL
NRBC BLD AUTO-RTO: 0 /100 WBC
PLATELET # BLD AUTO: 190 K/UL (ref 164–446)
PMV BLD AUTO: 10.2 FL (ref 9–12.9)
POTASSIUM SERPL-SCNC: 4.1 MMOL/L (ref 3.6–5.5)
RBC # BLD AUTO: 4.02 M/UL (ref 4.7–6.1)
SODIUM SERPL-SCNC: 135 MMOL/L (ref 135–145)
VANCOMYCIN TROUGH SERPL-MCNC: 13.5 UG/ML (ref 10–20)
WBC # BLD AUTO: 13.3 K/UL (ref 4.8–10.8)

## 2017-12-18 PROCEDURE — A9270 NON-COVERED ITEM OR SERVICE: HCPCS | Performed by: SURGERY

## 2017-12-18 PROCEDURE — 80048 BASIC METABOLIC PNL TOTAL CA: CPT

## 2017-12-18 PROCEDURE — 700102 HCHG RX REV CODE 250 W/ 637 OVERRIDE(OP): Performed by: HOSPITALIST

## 2017-12-18 PROCEDURE — 700111 HCHG RX REV CODE 636 W/ 250 OVERRIDE (IP): Performed by: SURGERY

## 2017-12-18 PROCEDURE — 36415 COLL VENOUS BLD VENIPUNCTURE: CPT

## 2017-12-18 PROCEDURE — 700105 HCHG RX REV CODE 258: Performed by: HOSPITALIST

## 2017-12-18 PROCEDURE — 700101 HCHG RX REV CODE 250: Performed by: HOSPITALIST

## 2017-12-18 PROCEDURE — 99239 HOSP IP/OBS DSCHRG MGMT >30: CPT | Performed by: INTERNAL MEDICINE

## 2017-12-18 PROCEDURE — A9270 NON-COVERED ITEM OR SERVICE: HCPCS | Performed by: HOSPITALIST

## 2017-12-18 PROCEDURE — 85025 COMPLETE CBC W/AUTO DIFF WBC: CPT

## 2017-12-18 PROCEDURE — 80202 ASSAY OF VANCOMYCIN: CPT

## 2017-12-18 PROCEDURE — 700111 HCHG RX REV CODE 636 W/ 250 OVERRIDE (IP): Performed by: HOSPITALIST

## 2017-12-18 PROCEDURE — 700102 HCHG RX REV CODE 250 W/ 637 OVERRIDE(OP): Performed by: SURGERY

## 2017-12-18 RX ORDER — ACETAMINOPHEN 325 MG/1
650 TABLET ORAL EVERY 6 HOURS PRN
Qty: 30 TAB | Refills: 0 | Status: SHIPPED | OUTPATIENT
Start: 2017-12-18 | End: 2022-10-10

## 2017-12-18 RX ORDER — AMOXICILLIN AND CLAVULANATE POTASSIUM 875; 125 MG/1; MG/1
1 TABLET, FILM COATED ORAL 2 TIMES DAILY
Qty: 14 TAB | Refills: 0 | Status: SHIPPED | OUTPATIENT
Start: 2017-12-18 | End: 2017-12-25

## 2017-12-18 RX ADMIN — ENOXAPARIN SODIUM 40 MG: 100 INJECTION SUBCUTANEOUS at 07:41

## 2017-12-18 RX ADMIN — POTASSIUM CHLORIDE AND SODIUM CHLORIDE: 900; 150 INJECTION, SOLUTION INTRAVENOUS at 04:06

## 2017-12-18 RX ADMIN — IBUPROFEN 800 MG: 800 TABLET, FILM COATED ORAL at 12:17

## 2017-12-18 RX ADMIN — ACETAMINOPHEN 1000 MG: 500 TABLET, FILM COATED ORAL at 07:41

## 2017-12-18 RX ADMIN — SODIUM CHLORIDE: 9 INJECTION, SOLUTION INTRAVENOUS at 03:16

## 2017-12-18 RX ADMIN — VANCOMYCIN HYDROCHLORIDE 800 MG: 100 INJECTION, POWDER, LYOPHILIZED, FOR SOLUTION INTRAVENOUS at 06:05

## 2017-12-18 RX ADMIN — OXYCODONE HYDROCHLORIDE 5 MG: 5 TABLET ORAL at 04:45

## 2017-12-18 RX ADMIN — IBUPROFEN 800 MG: 800 TABLET, FILM COATED ORAL at 07:41

## 2017-12-18 RX ADMIN — ACETAMINOPHEN 1000 MG: 500 TABLET, FILM COATED ORAL at 02:59

## 2017-12-18 ASSESSMENT — PAIN SCALES - GENERAL
PAINLEVEL_OUTOF10: 7
PAINLEVEL_OUTOF10: 0
PAINLEVEL_OUTOF10: 8
PAINLEVEL_OUTOF10: 0

## 2017-12-18 NOTE — DISCHARGE SUMMARY
Hospital Medicine Discharge Note     Admit Date:  12/16/2017       Discharge Date:   12/18/2017    Attending Physician:  Yaneth Eldridge     Diagnoses (includes active and resolved):       Abscess, perirectal POA: Yes    Sepsis (CMS-[facility]) POA: Yes    Cellulitis  Leukocytosis    Chief Complaint   Patient presents with   • Rash   • Body Aches   • Pain       Hosiery of Present Illness  Patient is a 42-year-old gentleman complaining of perirectal pain.   Detailed info pls see H&P.    Hospital Summary (Brief Narrative):       Patient is a 42-year-old gentleman known history of medical issue presented with complaint of perirectal pain. Apparently patient was seen by the ER physician and was noticed to have sepsis and inflammation changes in perirectal area. CT scan confirmed abscess. Patient was then seen by general surgery and patient was operated with I and D. Patient was also treated with IV fluid rehydration and IV Zosyn. Patient tolerated procedure and recovered well. Patient will be discharged home with Augmentin general surgery by mouth and follow up with general surgery in clinic.     Consultants:      General surgery    Procedures:        PreOp Diagnosis:   1. Right Perianal abscess     PostOp Diagnosis: same     Procedure(s):  BRITTANY ANAL ABSCESS INCISION AND DRAINAGE - Wound Class: Contaminated       Discharge Medications:        (x)  Medication Reconciliation Completed       Medication List      START taking these medications      Instructions   acetaminophen 325 MG Tabs  Commonly known as:  TYLENOL   Take 2 Tabs by mouth every 6 hours as needed (Mild Pain; (Pain scale 1-3); Temp greater than 100.5 F).  Dose:  650 mg     amoxicillin-clavulanate 875-125 MG Tabs  Commonly known as:  AUGMENTIN   Take 1 Tab by mouth 2 times a day for 7 days.  Dose:  1 Tab            Disposition:   Discharge home    Diet:   Regular    Activity:   As tolerated    Code status:   Full code    Primary Care Provider:    Pcp Pt States  None    Follow up appointment details :      PCP in 2 weeks  Supa Clayton M.D.  [address] [facility] [address]  [place] [address]  [phone]    In 1 week      No future appointments.    Pending Studies:        None    Time spent on discharge day patient visit: >35 minutes  Patient recovered sooner and currently very stable to be discharge home. Patient's hospital stay is only one day and less than initially expected due to quicker recovery. Patient has an unexpected recovery in the hospital.      #################################################    Interval history/exam for day of discharge:    Vitals:    12/17/17 1905 12/17/17 2330 12/18/17 0320 12/18/17 0710   BP: 107/67 101/66 105/63 (!) 99/65   Pulse: 67 62 60 (!) 54   Resp: 18 17 18 16   Temp: 36.4 °C (97.5 °F) 36.1 °C (97 °F) 36.2 °C (97.1 °F) 36.4 °C (97.6 °F)   TempSrc:       SpO2: 94% 93% 93% 98%   Weight:       Height:         Weight/BMI: Body mass index is 30.8 kg/m².  Pulse Oximetry: 98 %, O2 (LPM): 0, O2 Delivery: None (Room Air)    Gen: AAOx3, NAD  Eyes: PELLA  Neck: no JVD, no lymphadenopathy  Cardia: RRR, no mrg  Lungs: CTAB, no rales, rhonci or wheezing  Abd: NABS, soft, non extended, no mass  EXT: No C/C/E, peripheral pulse 2+ b/l  Neuro: CN II-XII intact, non focal, reflex 2+ symmetrical  Skin: Intact, no lesion, warm  Psych: Appropriate.    Most Recent Labs:    Lab Results   Component Value Date/Time    WBC 13.3 (H) 12/18/2017 05:13 AM    RBC 4.02 (L) 12/18/2017 05:13 AM    HEMOGLOBIN 11.5 (L) 12/18/2017 05:13 AM    HEMATOCRIT 35.6 (L) 12/18/2017 05:13 AM    MCV 88.6 12/18/2017 05:13 AM    MCH 28.6 12/18/2017 05:13 AM    MCHC 32.3 (L) 12/18/2017 05:13 AM    MPV 10.2 12/18/2017 05:13 AM    NEUTSPOLYS 85.40 (H) 12/18/2017 05:13 AM    LYMPHOCYTES 9.60 (L) 12/18/2017 05:13 AM    MONOCYTES 4.00 12/18/2017 05:13 AM    EOSINOPHILS 0.00 12/18/2017 05:13 AM    BASOPHILS 0.20 12/18/2017 05:13 AM      Lab Results   Component Value Date/Time    SODIUM 135  12/18/2017 05:13 AM    POTASSIUM 4.1 12/18/2017 05:13 AM    CHLORIDE 108 12/18/2017 05:13 AM    CO2 20 12/18/2017 05:13 AM    GLUCOSE 142 (H) 12/18/2017 05:13 AM    BUN 11 12/18/2017 05:13 AM    CREATININE 0.71 12/18/2017 05:13 AM    CREATININE 1.0 02/09/2006 05:15 AM      Lab Results   Component Value Date/Time    ALTSGPT 55 (H) 12/16/2017 07:50 PM    ASTSGOT 76 (H) 12/16/2017 07:50 PM    ALKPHOSPHAT 139 (H) 12/16/2017 07:50 PM    TBILIRUBIN 1.2 12/16/2017 07:50 PM    ALBUMIN 3.9 12/16/2017 07:50 PM    ALBUMIN <1.0 02/09/2006 03:10 AM    GLOBULIN 4.4 (H) 12/16/2017 07:50 PM    INR 1.08 02/08/2006 07:48 PM     Lab Results   Component Value Date/Time    PROTHROMBTM 13.7 02/08/2006 07:48 PM    INR 1.08 02/08/2006 07:48 PM        Imaging/ Testing:      CT-PELVIS W/O   Final Result         Multiloculated abscess in the medial right gluteal fold adjacent to the anus.      CT-ABDOMEN-PELVIS WITH   Final Result         1. No acute abnormality identified in the abdomen or pelvis.      2. Absent right kidney. Hypertrophic left kidney.      DX-CHEST-PORTABLE (1 VIEW)   Final Result         1. No acute cardiopulmonary abnormalities are identified.          Instructions:      The patient was instructed to return to the ER in the event of worsening symptoms. I have counseled the patient on the importance of compliance and the patient has agreed to proceed with all medical recommendations and follow up plan indicated above.   The patient understands that all medications come with benefits and risks. Risks may include permanent injury or death and these risks can be minimized with close reassessment and monitoring.        This dictation was created using voice recognition software. The accuracy of the dictation is limited to the abilities of the software. Although every efforts have been used to decrease the error, I expect there may be some errors of grammar and possibly content.

## 2017-12-18 NOTE — PROGRESS NOTES
Received report from evening RN.  Assumed care of patient.  Patient A&Ox4.  No complaints of pain at this time.  No nausea or vomiting.  Tolerating regular diet.  TELLEZ, strength 5/5. No numbness/tingling.  +void.  +BM.  Flory rectal area with packing in place, covered with ABD pad and mesh underwear.  Patient to have sitz bath and dressing change this shift, plan of care discussed.  Call light within reach.  Bed in lowest position.

## 2017-12-18 NOTE — DISCHARGE PLANNING
Referral: Follow Up    Intervention: SW met with pt at bedside.  SW notified pt, his scripts are available for  at the [facility], pt voiced understanding.    Plan: Home with medication assistance.

## 2017-12-18 NOTE — DISCHARGE PLANNING
Referral: Assistance with Discharge Medication.    Intervention: Received script for Augmentin.  DARÍO faxed script to the [facility], their price $49.99, pt states he is no able to pay for it as he does not get his paycheck until Friday.  DARÍO faxed script to the [facility], their price $9.94, Approved Services Form faxed.    Plan: Home with medication assistance.

## 2017-12-18 NOTE — DISCHARGE INSTRUCTIONS
Discharge Instructions    Discharged to home by car with relative. Discharged via wheelchair, hospital escort: Yes.  Special equipment needed: Not Applicable    Be sure to schedule a follow-up appointment with your primary care doctor or any specialists as instructed.     Discharge Plan:   Diet Plan: Discussed  Activity Level: Discussed  Confirmed Follow up Appointment: Patient to Call and Schedule Appointment  Confirmed Symptoms Management: Discussed  Medication Reconciliation Updated: Yes  Influenza Vaccine Indication: Patient Refuses    I understand that a diet low in cholesterol, fat, and sodium is recommended for good health. Unless I have been given specific instructions below for another diet, I accept this instruction as my diet prescription.   Other diet: Regular    Special Instructions: Take sitz bath 2 times a day, and after bowel movements, and change dressing after bath    · Is patient discharged on Warfarin / Coumadin?   No     · Is patient Post Blood Transfusion?  No    Perirectal Abscess  An abscess is an infected area that contains a collection of pus. A perirectal abscess is an abscess that is near the opening of the anus or around the rectum. A perirectal abscess can cause a lot of pain, especially during bowel movements.  CAUSES  This condition is almost always caused by an infection that starts in an anal gland.  RISK FACTORS  This condition is more likely to develop in:  · People with diabetes or inflammatory bowel disease.  · People whose body defense system (immune system) is weak.  · People who have anal sex.  · People who have a sexually transmitted disease (STD).  · People who have certain kinds of cancers, such as rectal carcinoma, leukemia, or lymphoma.  SYMPTOMS  The main symptom of this condition is pain. The pain may be a throbbing pain that gets worse during bowel movements. Other symptoms include:  · Fever.  · Swelling.  · Redness.  · Bleeding.  · Constipation.  DIAGNOSIS  The  "condition is diagnosed with a physical exam. If the abscess is not visible, a health care provider may need to place a finger inside the rectum to find the abscess. Sometimes, imaging tests are done to determine the size and location of the abscess. These tests may include:  · An ultrasound.  · An MRI.  · A CT scan.  TREATMENT  This condition is usually treated with incision and drainage surgery. Incision and drainage surgery involves making an incision over the abscess to drain the pus. Treatment may also involve antibiotic medicine, pain medicine, stool softeners, or laxatives.  HOME CARE INSTRUCTIONS  · Take medicines only as directed by your health care provider.  · If you were prescribed an antibiotic, finish all of it even if you start to feel better.  · To relieve pain, try sitting:  ¨ In a warm, shallow bath (sitz bath).  ¨ On a heating pad with the setting on low.  ¨ On an inflatable \"donut\" cushion.  · Follow any diet instructions as directed by your health care provider.  · Keep all follow-up visits as directed by your health care provider. This is important.  SEEK MEDICAL CARE IF:  · Your abscess is bleeding.  · You have pain, swelling, or redness that is getting worse.  · You are constipated.  · You feel ill.  · You have muscle aches or chills.  · You have a fever.  · Your symptoms return after the abscess has healed.     This information is not intended to replace advice given to you by your health care provider. Make sure you discuss any questions you have with your health care provider.     Document Released: 12/15/2001 Document Revised: 01/08/2016 Document Reviewed: 10/28/2015  [employer] Interactive Patient Education ©2016 [employer]      Baño de Asiento    Un baño de asiento es un baño de [place] tomado en posición de sentado. El agua debe cubrir las caderas y las nalgas. Puede utilizarse para propósitos de higiene o curación. Los danyell de asiento a menudo se utilizan para aliviar el dolor, " picazón o espasmos musculares. El agua podrá contener alguna medicación. El calor húmedo lo ayudará a curarse y relajarse.   CUIDADOS EN EL HOGAR   [place] de 3 a 4 danyell de asiento por día.   1. Llene la bañadera hasta la mitad con [place] IRA.  2. Siéntese en el agua y whit un poco el desagüe.  3. Whit el [place] IRA para mantener la bañadera llena hasta la mitad. Deje el agua corriendo de manera fredrick.  4. Sumérjase en el agua por 15 a 20 minutos.  5. Luego del baño de asiento, seque elmira la parte afectada.  SOLICITE AYUDA DE INMEDIATO SI:   Empeora en lugar de mejorar. Suspenda los danyell de asiento si ve que empeora.   ASEGÚRESE DE QUE:   · Comprende estas instrucciones.  · Controlará palmer enfermedad.  · Solicitará ayuda de inmediato si no mejora o si empeora.  Document Released: 05/21/2012 Document Revised: 09/11/2013  ExitCare® Patient Information ©2014 [employer].    Incisión y drenaje   (Incision and Drainage)  Incisión y drenaje es un procedimiento en el que se abre y se drena leighton estructura similar a leighton bolsa (estructura quística). El área a drenar generalmente contiene ciertos materiales, teresita pus, líquido o nery.   INFORME A PALMER MÉDICO SOBRE:   · Reacción alérgica a los medicamentos.  · Medicamentos que utiliza, incluyendo vitaminas, hierbas, gotas oftálmicas, medicamentos de venta keyona y cremas.  · Uso de corticoides (por vía oral o cremas).  · Problemas anteriores debido a anestésicos o a medicamentos que disminuyen la sensibilidad.  · Antecedentes de hemorragias o coágulos sanguíneos.  · Cirugías anteriores.  · Otros problemas de adolfo, incluyendo diabetes y problemas renales.  · Posibilidad de embarazo, si corresponde.  RIESGOS Y COMPLICACIONES   · Dolor.  · Sangrado.  · Cicatrización.  · Infecciones.  ANTES DEL PROCEDIMIENTO   Es posible que necesite hacerse leighton ecografía u otras pruebas de diagnóstico por imagen para lucas el tamaño o la profundidad de la estructura quística. También  podrán indicarle análisis de nery para determinar si usted padece leighton infección y cuál es la gravedad de la misma. Debe aplicarse la vacuna antitetánica.   PROCEDIMIENTO   La adilene afectada se higienizará con un líquido especial. Se adormecerá el área con un medicamento (anestesia local). Le harán leighton pequeña incisión en la estructura quística. Para drenar el contenido de la estructura quística podrán utilizar leighton jeringa o un catéter, o lo apretarán para que salga hacia afuera. Luego se higieniza la adilene con leighton solución especial. Después de la limpieza de la adilene, se rellena con leighton gasa u otro apósito para heridas. Luego se cubre con gasa y cinta adhesiva o algún otro apósito para heridas.    DESPUÉS DEL PROCEDIMIENTO   · Generalmente se puede volver a casa el mismo día del procedimiento.  · Le podrán recetar antibióticos para prevenir infecciones..  · Si la adilene fue vendada con gasa o algún otro vendaje para heridas, es probable que deba volver en 1 ó 2 días para que se lo quiten.  · La herida debe curarse en aproximadamente 14 je.     Esta información no tiene teresita fin reemplazar el consejo del médico. Asegúrese de hacerle al médico cualquier pregunta que tenga.     Document Released: 12/18/2006 Document Revised: 05/03/2016  i[employer] Interactive Patient Education ©2016 [employer].      Medicamentos Antibióticos  (Antibiotic Medication)  Los antibióticos se encuentran entre los medicamentos más prescritos. Sin embargo, no son de utilidad en roscoe de resfríos, gripe u otras infecciones virales. Lanare sólo la medicación teresita le ha indicado el profesional que lo asiste. Nunca tome o administre medicamentos que son de otra persona o medicamentos que hayan sobrado.  Asegúrese de comentarle a palmer médico si usted:  · Sufre alergias.  · El costo del medicamento.  · El calendario de dosificación.  · Sabor.  · Efectos adversos comunes al elegir antibióticos para tratar leighton infección.  Consulte con el profesional si  tiene dudas acerca de la razón por la que se hirsch elegido determinado medicamento.  INSTRUCCIONES PARA EL CUIDADO DOMICILIARIO  Chary atentamente todas las instrucciones y rótulos en los envases de los medicamentos. Ciertos antibióticos deben tomarse con el estómago vacío, mientras que otros deben tomarse junto con alimentos. Utilizar los antibióticos de forma incorrecta puede reducir palmer efectividad. Ciertos antibióticos deben mantenerse en el refrigerador. Otros deben mantenerse a temperatura ambiente. Consulte con el profesional o el farmacéutico si no comprende cómo debe utilizar el medicamento.  Asegúrese de administrar la cantidad de medicamento prescrita por el médico. Aunque se sienta mejor y modesta síntomas disminuyan, aún pueden rnacho bacterias barbara en palmer cuerpo. Esther todo el medicamento servirá para prevenir que:  · La infección vuelva y sea más difícil de tratar.  · Se presenten complicaciones debido a infecciones tratadas parcialmente.  Si existen medicamentos sobrantes luego de rancho tomado la cantidad que el profesional le indicó, tírelos.  Asegúrese de informarle al médico si:  · Es alérgico a cualquier medicamento.  · Está embarazada o está buscando quedar embarazada mientras esté utilizando el medicamento.  · Está amamantando.  · Está tomando cualquier otro medicamento de prescripción, de venta keyona, o a base de plantas.  · Tiene otros trastornos o problemas médicos que no ha comentado aún.  Yolanda píldoras anticonceptivas, podrían no tener efecto cuando tome antibióticos. Para evitar un embarazo no deseado:  · Siga tomando las píldoras teresita lo hace habitualmente.  · Utilice un elissa método de control de la natalidad (teresita condones) mientras yolanda los antibióticos.  · Cuando finalice con los antibióticos, siga con el elissa método hasta que finalice el ciclo mensual de píldoras anticonceptivas.  Tómelos hasta finalizarlos, aunque se sienta mejor. Trate de no saltear ninguna dosis. Si le ocurre, tómela  lo antes posible. Chris si está cerca de la hora de la próxima dosis y palmer esquema es:  · 2 dosis por día, tome la dosis que ha salteado y la próxima a las 5 ó 6 horas.  · 3 dosis por día, tome la dosis que ha salteado y la próxima a las 2 ó 4 horas, o duplique la próxima dosis.Luego vuelva al esquema habitual.  · Si no puede recuperar leighton dosis omitida, tome la próxima dosis cuando corresponda y complete la dosis omitida al final de todas las dosis prescritas.  EFECTOS ADVERSOS DE LOS ANTIBIÓTICOS  Entre los efectos adversos comunes de los antibióticos se encuentran:  · Materia fecal blanda o diarrea.  · Malestar estomacal leve.  · Sensibilidad al sol.  SOLICITE ATENCIÓN MÉDICA SI:  · Empeora o no mejora luego de unos días de rancho comenzado a kiet el medicamento.  · Presenta vómitos.  · El bebé presenta dermatitis del pañal o aparece un sarpullido en los genitales.  · Presenta prurito vaginal.  · Aparecen manchas clarence en la lengua o en la boca.  · Presenta diarrea jet y cólicos abdominales.  · Desarrolla signos de alergia (urticaria, aparece leighton erupción desconocida que pica). DEJE DE KIET EL ANTIBIÓTICO.  SOLICITE ATENCIÓN MÉDICA DE INMEDIATO SI:  · La orina se vuelve oscura o cambia de color por la presencia de nery.  · Presenta un noreen amarillo en la piel.  · Sangra o aparecen hematomas con facilidad.  · Siente dolor en las articulaciones o musculares.  · La fiebre vuelve.  · Siente dolor de vinay intenso.  · Desarrolla signos de alergia (problemas para respirar, sibilancias, hinchazón de los labios, la karie o la lengua, desmayos, ampollas en la piel o la boca). DEJE DE KIET EL ANTIBIÓTICO.     Esta información no tiene teresita fin reemplazar el consejo del médico. Asegúrese de hacerle al médico cualquier pregunta que tenga.     Document Released: 03/26/2009 Document Revised: 03/11/2013  Elsevier Interactive Patient Education ©2016 Elsevier Inc.      Depression / Suicide Risk    As you are discharged  from this [facility] facility, it is important to learn how to keep safe from harming yourself.    Recognize the warning signs:  · Abrupt changes in personality, positive or negative- including increase in energy   · Giving away possessions  · Change in eating patterns- significant weight changes-  positive or negative  · Change in sleeping patterns- unable to sleep or sleeping all the time   · Unwillingness or inability to communicate  · Depression  · Unusual sadness, discouragement and loneliness  · Talk of wanting to die  · Neglect of personal appearance   · Rebelliousness- reckless behavior  · Withdrawal from people/activities they love  · Confusion- inability to concentrate     If you or a loved one observes any of these behaviors or has concerns about self-harm, here's what you can do:  · Talk about it- your feelings and reasons for harming yourself  · Remove any means that you might use to hurt yourself (examples: pills, rope, extension cords, firearm)  · Get professional help from the community (Mental Health, Substance Abuse, psychological counseling)  · Do not be alone:Call your Safe Contact- someone whom you trust who will be there for you.  · Call your local CRISIS HOTLINE 722-2727 or 840-629-9109  · Call your local Children's Mobile Crisis Response Team [place] (077) 428-8204 or www.Fliqz  · Call the toll free National Suicide Prevention Hotlines   · National Suicide Prevention Lifeline 843-144-TUMZ (6728)  · National Hope Line Network 800-SUICIDE (307-3392)

## 2017-12-18 NOTE — PROGRESS NOTES
"Progress Note:  12/18/2017, 10:04 AM    S: no acute events. Pain reported to be gone. No F/C/N/V    O:  Blood pressure (!) 99/65, pulse (!) 54, temperature 36.4 °C (97.6 °F), resp. rate 16, height 1.626 m (5' 4\"), weight 81.4 kg (179 lb 7.3 oz), SpO2 98 %.    NAD  Breathing non-labored  Mesh underwear with gauze dressing in place in perianal I&D site      A:   Active Hospital Problems    Diagnosis   • Abscess, perirectal [K61.1]     Priority: High   • Sepsis (CMS-[facility]CC) [A41.9]     42M with perianal abscess s/p I&D 12/17  Progressing as expected    P:   Sitz baths daily and with BM's  He may change gauze and underwear BID and PRN BM  Recommend d/c on Augmentin 5 days for associated cellulitis  F/u 7-10 days in my office for wound check  OK for d/c from surgical standpoint    Supa Clayton M.D.  [facility]  [phone]    "

## 2017-12-18 NOTE — PROGRESS NOTES
Patient given discharge instructions and prescriptions, through  line.  Patient to complete sitz baths BID and after BMs, and to keep gauze dressing clean and dry.   Patient demonstrated understanding.  Patient to  augmentin and complete antibiotic treatment.  Patient to follow up with Dr. Clayton in 1 week.  Patient demonstrated understanding.  All questions answered through  line.  PIVs removed.  Patient awaiting ride from friend.

## 2017-12-18 NOTE — PROGRESS NOTES
A/Ox4, VSS.  Denies pain at this time.  Packing to perineal area with ABD pad and mesh brief, moderate, sanguinous drainage.  No nausea.  IVF and IV abx infusing to PIV, patent.  RA, satting >90%, clear TAF.  IS education provided and use encouraged.  +normoactive BSx4, +flatus, +BM this shift.  POC discussed, all questions answered.  Call light within reach, calls appropriately.  Bed in low and locked position.

## 2017-12-19 LAB
BACTERIA WND AEROBE CULT: ABNORMAL
BACTERIA WND AEROBE CULT: ABNORMAL
GRAM STN SPEC: ABNORMAL
SIGNIFICANT IND 70042: ABNORMAL
SITE SITE: ABNORMAL
SOURCE SOURCE: ABNORMAL

## 2017-12-20 LAB
BACTERIA SPEC ANAEROBE CULT: ABNORMAL
BACTERIA SPEC ANAEROBE CULT: ABNORMAL
SIGNIFICANT IND 70042: ABNORMAL
SITE SITE: ABNORMAL
SOURCE SOURCE: ABNORMAL

## 2017-12-22 LAB
BACTERIA BLD CULT: NORMAL
BACTERIA BLD CULT: NORMAL
SIGNIFICANT IND 70042: NORMAL
SIGNIFICANT IND 70042: NORMAL
SITE SITE: NORMAL
SITE SITE: NORMAL
SOURCE SOURCE: NORMAL
SOURCE SOURCE: NORMAL

## 2022-02-18 ENCOUNTER — OCCUPATIONAL MEDICINE (OUTPATIENT)
Dept: URGENT CARE | Facility: CLINIC | Age: 47
End: 2022-02-18
Payer: COMMERCIAL

## 2022-02-18 VITALS
HEIGHT: 62 IN | BODY MASS INDEX: 34.93 KG/M2 | HEART RATE: 85 BPM | WEIGHT: 189.8 LBS | TEMPERATURE: 97.8 F | SYSTOLIC BLOOD PRESSURE: 138 MMHG | DIASTOLIC BLOOD PRESSURE: 80 MMHG | RESPIRATION RATE: 20 BRPM | OXYGEN SATURATION: 92 %
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
## 2022-02-18 DIAGNOSIS — S05.02XA ABRASION OF LEFT CORNEA, INITIAL ENCOUNTER: ICD-10-CM

## 2022-02-18 LAB
AMP AMPHETAMINE: NORMAL
BREATH ALCOHOL COMMENT: NORMAL
COC COCAINE: NORMAL
INT CON NEG: NORMAL
INT CON POS: NORMAL
MET METHAMPHETAMINES: NORMAL
OPI OPIATES: NORMAL
PCP PHENCYCLIDINE: NORMAL
POC BREATHALIZER: 0 PERCENT (ref 0–0.01)
POC DRUG COMMENT 753798-OCCUPATIONAL HEALTH: NEGATIVE
THC: NORMAL

## 2022-02-18 RX ORDER — ERYTHROMYCIN 5 MG/G
1 OINTMENT OPHTHALMIC 4 TIMES DAILY
Qty: 3.5 G | Refills: 0 | Status: SHIPPED | OUTPATIENT
Start: 2022-02-18 | End: 2022-02-25

## 2022-02-18 ASSESSMENT — VISUAL ACUITY: OU: 1

## 2022-02-18 ASSESSMENT — ENCOUNTER SYMPTOMS
PHOTOPHOBIA: 0
EYE REDNESS: 1
EYE PAIN: 1
DOUBLE VISION: 0
DIZZINESS: 0
BLURRED VISION: 0
EYE DISCHARGE: 1
HEADACHES: 0
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
## 2022-02-19 NOTE — PATIENT INSTRUCTIONS
Abrasión corneal  Corneal Abrasion    Leighton abrasión corneal es un rasguño o lesión en la cubierta transparente en la parte delantera del cielo (córnea). La córnea es leighton especie de cúpula transparente que protege el cielo y ayuda a fijar la vista. La córnea está formada por muchas capas. La capa más superficial es leighton angely capa de células llamada epitelio corneal. La córnea es dhaval de los tejidos más sensibles del cuerpo. Leighton abrasión corneal puede resultar muy dolorosa.  Si no se trata, puede infectarse y producir leighton úlcera. [place] puede dejar cicatrices. Las cicatrices en la córnea pueden afectar la vista. A veces, las abrasiones pueden volver a aparecer en la misma adilene, incluso después de que la lesión original haya cicatrizado (síndrome erosivo recurrente).  ¿Cuáles son las causas?  Esta afección puede ser causada por lo siguiente:  · Un golpe o pinchazo en el cielo.  · Leighton sustancia arenosa o irritante (cuerpo extraño) en el cielo.  · Frotarse el cielo en exceso.  · Ojos muy secos.  · Ciertas infecciones oculares.  · Lentes de contacto que no encajan lisbeth o que se usan loreta períodos prolongados. También puede lastimarse la córnea cuando se pone o se suad los lentes de contacto.  · Cirugía ocular.  A veces, la causa es desconocida.  ¿Cuáles son los signos o los síntomas?  Los síntomas de esta afección incluyen lo siguiente:  · Dolor en el cielo. El dolor puede empeorar cuando abre o mueve los ojos.  · Sensación de que tiene algo metido en el cielo.  · Dificultad para mantener los ojos abiertos o imposibilidad de mantenerlos abiertos.  · Lagrimeo y enrojecimiento.  · Sensibilidad a la guero.  · Visión borrosa.  · Dolor de vinay.  ¿Cómo se diagnostica?  Esta afección se puede diagnosticar en función de lo siguiente:  · Shanna antecedentes médicos.  · Shanna síntomas.  · Un examen ocular. Puede consultar a un especialista en afecciones y enfermedades oculares (oftalmólogo). Antes del examen ocular, es posible que le coloquen gotas  anestésicas dentro del cielo. También es posible que le coloquen leighton sustancia de contraste con un gotero o con leighton pequeña kory de papel. Con la sustancia de contraste, al oftalmólogo le resulta más fácil lucas la abrasión cuando le examina el cielo con leighton guero. El oftalmólogo puede examinarle el cielo a través de un oftalmoscopio (lámpara de naderidura).  ¿Cómo se trata esta afección?  El tratamiento puede variar según la causa de la afección, y puede incluir lo siguiente:  · Lavado del cielo.  · Eliminación de todo cuerpo extraño.  · Gotas o pomadas con antibiótico para tratar leighton infección.  · Gotas o pomadas con corticoesteroides para tratar el enrojecimiento, la irritación o la inflamación.  · Analgésicos.  · Un parche ocular para mantener el cielo cerrado.  Siga estas indicaciones en palmer casa:  Medicamentos  · Use gotas o pomadas oftálmicas según las indicaciones del médico.  · Si le recetaron gotas o pomada con antibiótico, úselas según las indicaciones del médico. No deje de usar el antibiótico aunque comience a sentirse mejor.  · [place] los medicamentos de venta keyona y los recetados solamente teresita se lo haya indicado el médico.  · No conduzca ni use maquinaria pesada mientras yolanda analgésicos recetados.  Instrucciones generales  · Si tiene un parche ocular, úselo según las indicaciones del médico.  ? No conduzca ni use maquinaria mientras usa el parche ocular. En estas condiciones no puede juzgar correctamente las distancias.  ? Siga las indicaciones del médico acerca de cuándo quitarse el parche.  · Pregúntele al médico si puede usar un paño frío y húmedo (compresa) en el cielo para aliviar el dolor.  · No se toque ni se frote el cielo. No se lave el cielo.  · No use lentes de contacto hasta que el médico lo autorice.  · Evite la guero jet y la fatiga ocular.  · Concurra a todas las visitas de control teresita se lo haya indicado el médico. Berry es importante para prevenir infecciones y evitar la pérdida de visión.  Comuníquese  con un médico si:  · Continúa con dolor en el cielo y otros síntomas loreta más de 2 días.  · Tiene síntomas nuevos, teresita enrojecimiento, lagrimeo o secreción.  · Tiene leighton secreción que le kristine los ojos pegados a la mañana.  · El parche ocular se afloja al punto que puede parpadear.  · Los síntomas vuelven a aparecer después de que la abrasión original haya cicatrizado.  Solicite ayuda de inmediato si:  · Tiene dolor intenso en el cielo que no mejora con medicamentos.  · Tiene pérdida de visión.  Resumen  · Leighton abrasión corneal es un rasguño en la cubierta transparente en la parte delantera del cielo (córnea).  · La abrasión corneal puede causar dolor en el cielo, enrojecimiento, lagrimeo o visión borrosa.  · Por lo general, esta afección se trata con medicamentos para prevenir infecciones y evitar que queden cicatrices anormales. También es posible que deba usar un parche ocular para cubrirse el cielo.  · Informe al médico si modesta síntomas continúan loreta más de 2 días.  Esta información no tiene teresita fin reemplazar el consejo del médico. Asegúrese de hacerle al médico cualquier pregunta que tenga.  Document Released: 12/18/2006 Document Revised: 03/21/2018 Document Reviewed: 03/21/2018  Elsevier Interactive Patient Education © 2020 [employer]

## 2022-02-20 ENCOUNTER — OCCUPATIONAL MEDICINE (OUTPATIENT)
Dept: URGENT CARE | Facility: CLINIC | Age: 47
End: 2022-02-20
Payer: COMMERCIAL

## 2022-02-20 VITALS
HEIGHT: 62 IN | SYSTOLIC BLOOD PRESSURE: 140 MMHG | OXYGEN SATURATION: 98 % | HEART RATE: 65 BPM | DIASTOLIC BLOOD PRESSURE: 80 MMHG | RESPIRATION RATE: 16 BRPM | TEMPERATURE: 97.7 F | WEIGHT: 189 LBS | BODY MASS INDEX: 34.78 KG/M2

## 2022-02-20 DIAGNOSIS — S05.02XD ABRASION OF LEFT CORNEA, SUBSEQUENT ENCOUNTER: ICD-10-CM

## 2022-02-20 PROCEDURE — 99213 OFFICE O/P EST LOW 20 MIN: CPT | Performed by: PHYSICIAN ASSISTANT

## 2022-02-20 NOTE — LETTER
[facility] [address] [place] [address]  Phone:  [phone] - Fax:  [phone]   [facility] Progress Report and Disability Certification  Date of Service: 2/20/2022   No Show:  No  Date / Time of Next Visit:     Claim Information   Patient Name: Jass Feldman  Claim Number:     Employer: [employer]  Date of Injury: 2/18/2022     Insurer / TPA: [employer]  ID / SSN:     Occupation: [employer]  Diagnosis: The encounter diagnosis was Abrasion of left cornea, subsequent encounter.    Medical Information   Related to Industrial Injury? Yes    Subjective Complaints:  Date of injury 2/18/2022, patient presents for follow-up after left-sided corneal abrasion.  He reports that his pain is significantly improved he is not noticed any of his visual acuities.  He has been using the erythromycin ointment.  He has not no concerns today   Objective Findings: Alert nontoxic male no acute distress.  PERRLA, EOMI, mild left-sided conjunctival injection without discharge or matting.  No periorbital edema or ecchymosis.  No peripheral field vision loss.   Pre-Existing Condition(s):     Assessment:   Condition Improved    Status: Discharged /  MMI  Permanent Disability:No    Plan:      Diagnostics:      Comments:       Disability Information   Status: Released to Full Duty    From:     Through:   Restrictions are:     Physical Restrictions   Sitting:    Standing:    Stooping:    Bending:      Squatting:    Walking:    Climbing:    Pushing:      Pulling:    Other:    Reaching Above Shoulder (L):   Reaching Above Shoulder (R):       Reaching Below Shoulder (L):    Reaching Below Shoulder (R):      Not to exceed Weight Limits   Carrying(hrs):   Weight Limit(lb):   Lifting(hrs):   Weight  Limit(lb):     Comments:      Repetitive Actions   Hands: i.e. Fine Manipulations from Grasping:     Feet: i.e. Operating Foot Controls:     Driving / Operate  Machinery:     Health Care Provider’s Original or Electronic Signature  Everett Oliva P.A.-C. Health Care Provider’s Original or Electronic Signature    Supa Wyatt MD         Clinic Name / Location: [address] [address]  [place] [address] Clinic Phone Number: Dept: [phone]   Appointment Time: 4:00 Pm Visit Start Time: 4:04 PM   Check-In Time:  3:59 Pm Visit Discharge Time:     Original-Treating Physician or Chiropractor    Page 2-Insurer/TPA    Page 3-Employer    Page 4-Employee

## 2022-02-21 NOTE — PROGRESS NOTES
"Subjective:     Jass Feldman is a 46 y.o. male who presents for Follow-Up (Feeling better, using medication as directed.)      Date of injury 2/18/2022, patient presents for follow-up after left-sided corneal abrasion.  He reports that his pain is significantly improved he is not noticed any of his visual acuities.  He has been using the erythromycin ointment.  He has not no concerns today    PMH:   No pertinent past medical history to this problem  MEDS:  Medications were reviewed in EMR  ALLERGIES:  Allergies were reviewed in EMR  SOCHX:  Works as a gutierrez  FH:   No pertinent family history to this problem       Objective:     /80   Pulse 65   Temp 36.5 °C (97.7 °F) (Temporal)   Resp 16   Ht 1.58 m (5' 2.21\")   Wt 85.7 kg (189 lb)   SpO2 98%   BMI 34.34 kg/m²     Alert nontoxic male no acute distress.  PERRLA, EOMI, mild left-sided conjunctival injection without discharge or matting.  No periorbital edema or ecchymosis.  No peripheral field vision loss.    Assessment/Plan:       1. Abrasion of left cornea, subsequent encounter    • Released to Full Duty FROM   TO    •    •      Differential diagnosis, natural history, supportive care, and indications for immediate follow-up discussed.  "

## 2022-10-10 ENCOUNTER — NON-PROVIDER VISIT (OUTPATIENT)
Dept: URGENT CARE | Facility: PHYSICIAN GROUP | Age: 47
End: 2022-10-10
Payer: COMMERCIAL

## 2022-10-10 ENCOUNTER — OCCUPATIONAL MEDICINE (OUTPATIENT)
Dept: URGENT CARE | Facility: PHYSICIAN GROUP | Age: 47
End: 2022-10-10
Payer: COMMERCIAL

## 2022-10-10 VITALS
HEART RATE: 72 BPM | WEIGHT: 195 LBS | OXYGEN SATURATION: 98 % | DIASTOLIC BLOOD PRESSURE: 82 MMHG | SYSTOLIC BLOOD PRESSURE: 184 MMHG | RESPIRATION RATE: 18 BRPM | TEMPERATURE: 98.6 F | HEIGHT: 64 IN | BODY MASS INDEX: 33.29 KG/M2

## 2022-10-10 DIAGNOSIS — Z02.1 PRE-EMPLOYMENT DRUG SCREENING: ICD-10-CM

## 2022-10-10 DIAGNOSIS — S05.00XA CORNEAL ABRASION, UNSPECIFIED LATERALITY, INITIAL ENCOUNTER: ICD-10-CM

## 2022-10-10 LAB
AMP AMPHETAMINE: NEGATIVE
COC COCAINE: NEGATIVE
INT CON NEG: NORMAL
INT CON POS: NORMAL
MET METHAMPHETAMINES: NEGATIVE
OPI OPIATES: NEGATIVE
PCP PHENCYCLIDINE: NEGATIVE
POC DRUG COMMENT 753798-OCCUPATIONAL HEALTH: NORMAL
THC: NEGATIVE

## 2022-10-10 PROCEDURE — 80305 DRUG TEST PRSMV DIR OPT OBS: CPT | Performed by: PHYSICIAN ASSISTANT

## 2022-10-10 PROCEDURE — 99213 OFFICE O/P EST LOW 20 MIN: CPT | Performed by: FAMILY MEDICINE

## 2022-10-10 RX ORDER — POLYMYXIN B SULFATE AND TRIMETHOPRIM 1; 10000 MG/ML; [USP'U]/ML
1 SOLUTION OPHTHALMIC 4 TIMES DAILY
Qty: 10 ML | Refills: 0 | Status: SHIPPED | OUTPATIENT
Start: 2022-10-10 | End: 2022-10-15

## 2022-10-10 ASSESSMENT — ENCOUNTER SYMPTOMS: FEVER: 0

## 2022-10-10 NOTE — PROGRESS NOTES
"Subjective:     Jass Feldman is a 46 y.o. male who presents for Foreign Body in Eye (New WC debris is bilateral eyes)    HPI  Pt presents for evaluation of an acute problem  DOI: 10/10/22   EVANGELINA: Cutting wood and felt something get into his eyes bilaterally   Having pain in both eyes   Pain is constant and not improving   Having some mild blurry vision  Has a headache  Eyes feel watery  No eye discharge  Eyes are somewhat red    Review of Systems   Constitutional:  Negative for fever.   Skin:  Negative for rash.     PMH: Past medical history reviewed in Epic  MEDS: Medications were reviewed in Epic  ALLERGIES: Allergies were reviewed in Epic     Objective:   BP (!) 184/82   Pulse 72   Temp 37 °C (98.6 °F)   Resp 18   Ht 1.619 m (5' 3.75\")   Wt 88.5 kg (195 lb)   SpO2 98%   BMI 33.73 kg/m²     Physical Exam  Constitutional:       General: He is not in acute distress.     Appearance: He is well-developed. He is not diaphoretic.   Eyes:      Extraocular Movements: Extraocular movements intact.      Pupils: Pupils are equal, round, and reactive to light.   Pulmonary:      Effort: Pulmonary effort is normal.   Neurological:      Mental Status: He is alert.     Bilateral eyes with mild scleral injection, watering, and no foreign body visualized.  On fluorescein stain, 2 tiny corneal abrasions visible     Assessment/Plan:   Assessment    1. Corneal abrasion, unspecified laterality, initial encounter  - polymixin-trimethoprim (POLYTRIM) 30648-7.1 UNIT/ML-% Solution; Administer 1 Drop into both eyes 4 times a day for 5 days.  Dispense: 10 mL; Refill: 0    Patient with corneal abrasion.  Treat with Polytrim drops.  His vision is 25/20 bilaterally.  No significant changes in vision or indication for ophthalmology referral.  C4 and D 39 given  "

## 2022-10-10 NOTE — LETTER
[facility] [place]  [address] [address]  Phone:  [phone] - Fax:  [phone]   [facility] Progress Report and Disability Certification  Date of Service: 10/10/2022   No Show:  No  Date / Time of Next Visit: 10/14/2022   Claim Information   Patient Name: Jass Feldman  Claim Number:     Employer: [employer]  Date of Injury: 10/10/2022     Insurer / TPA: [employer]  ID / SSN:     Occupation:   Diagnosis: The encounter diagnosis was Corneal abrasion, unspecified laterality, initial encounter.    Medical Information   Related to Industrial Injury? Yes    Subjective Complaints:  Pt presents for evaluation of an acute problem  DOI: 10/10/22   EVANGELINA: Cutting wood and felt something get into his eyes bilaterally   Having pain in both eyes   Pain is constant and not improving   Having some mild blurry vision  Has a headache  Eyes feel watery  No eye discharge  Eyes are somewhat red   Objective Findings: Bilateral eyes with mild scleral injection, watering, and no foreign body visualized.  On fluorescein stain, 2 tiny corneal abrasions visible    Pre-Existing Condition(s):     Assessment:   Initial Visit    Status: Additional Care Required  Permanent Disability:No    Plan: Medication    Diagnostics:      Comments:       Disability Information   Status: Released to Restricted Duty    From:  10/10/2022  Through: 10/14/2022 Restrictions are: Temporary   Physical Restrictions   Sitting:    Standing:    Stooping:    Bending:      Squatting:    Walking:    Climbing:    Pushing:      Pulling:    Other:    Reaching Above Shoulder (L):   Reaching Above Shoulder (R):       Reaching Below Shoulder (L):    Reaching Below Shoulder (R):      Not to exceed Weight Limits   Carrying(hrs):   Weight Limit(lb):   Lifting(hrs):   Weight  Limit(lb):     Comments: Must wear eye protection at all times, no working in christian/dirty environments, no use of  power tools    Repetitive Actions   Hands: i.e. Fine Manipulations from Grasping:     Feet: i.e. Operating Foot Controls:     Driving / Operate Machinery:     Health Care Provider’s Original or Electronic Signature  Isaac Mike M.D. Health Care Provider’s Original or Electronic Signature    Supa Wyatt MD         Clinic Name / Location: [address] [address] Clinic Phone Number: Dept: [phone]   Appointment Time: 11:45 Am Visit Start Time: 12:53 PM   Check-In Time:  11:47 Am Visit Discharge Time:  2:30 PM   Original-Treating Physician or Chiropractor    Page 2-Insurer/TPA    Page 3-Employer    Page 4-Employee

## 2022-10-10 NOTE — LETTER
"EMPLOYEE’S CLAIM FOR COMPENSATION/ REPORT OF INITIAL TREATMENT  FORM C-4    EMPLOYEE’S CLAIM - PROVIDE ALL INFORMATION REQUESTED   First Name  Jass Last Name  John Feldman Birthdate                    1975                Sex  male Claim Number (Insurer’s Use Only)    Home Address  [address] Age  46 y.o. Height  1.619 m (5' 3.75\") Weight  88.5 kg (195 lb) [facility]     [facility] Zip  [address] Telephone  [phone] (home)    Mailing Address  [address] [facility] Zip  [address] Primary Language Spoken  Sami    Insurer  [employer] Third-Party   [employer]   Employee's Occupation (Job Title) When Injury or Occupational Disease Occurred      Employer's Name/Company Name  [employer]  Telephone  [phone]    Office Mail Address (Number and Street)   [address]  [facility]  Zip  [address]    Date of Injury  10/10/2022               Hours Injury  9:45 AM Date Employer Notified  10/10/2022 Last Day of Work after Injury     or Occupational Disease  10/10/2022 Supervisor to Whom Injury     Reported  oleg y karla   Address or Location of Accident (if applicable)  Work [1]   What were you doing at the time of accident? (if applicable)  cambiando un 2x6    How did this injury or occupational disease occur? (Be specific an answer in detail. Use additional sheet if necessary)  estava cortando unos clavos con el zoso y me brinco la basura en la karie   If you believe that you have an occupational disease, when did you first have knowledge of the disability and it relationship to your employment?  na Witnesses to the Accident  na      Nature of Injury or Occupational Disease  Workers' Compensation  Part(s) of Body Injured or Affected  Eye (L), Eye (R), N/A    I certify that the above is true and correct to the best of my knowledge " and that I have provided this information in order to obtain the benefits of [place]  I hereby authorize any physician, chiropractor, surgeon, practitioner, or other person, any hospital, including [facility] or [facility], any medical service organization, any insurance company, or other institution or organization to release to each other, any medical or other information, including benefits paid or payable, pertinent to this injury or disease, except information relative to diagnosis, treatment and/or counseling for AIDS, psychological conditions, alcohol or controlled substances, for which I must give specific authorization.  A Photostat of this authorization shall be as valid as the original.     Date   Place Employee’s Original or  *Electronic Signature   THIS REPORT MUST BE COMPLETED AND MAILED WITHIN 3 WORKING DAYS OF TREATMENT   Place  [facility]  Name of Facility  [facility]   Date  10/10/2022 Diagnosis and Description of Injury or Occupational Disease  (S05.00XA) Corneal abrasion, unspecified laterality, initial encounter Is there evidence the injured employee was under the influence of alcohol and/or another controlled substance at the time of accident?  ? No ? Yes (if yes, please explain)    Hour  12:53 PM   The encounter diagnosis was Corneal abrasion, unspecified laterality, initial encounter. No   Treatment  Polytrim eyedrops  Have you advised the patient to remain off work five days or     more?    X-Ray Findings      ? Yes Indicate dates:   From   To      From information given by the employee, together with medical evidence, can        you directly connect this injury or occupational disease as job incurred?  Yes ? No If no, is the injured employee capable of:  ? full duty  No ? modified duty  [place]   Is additional medical care by a physician indicated?  Yes If  "Modified Duty, Specify any Limitations / Restrictions  Must wear eye protection at all times, no working in christian/dirty environments, no use of power tools   Do you know of any previous injury or disease contributing to this condition or occupational disease?  ? Yes ? No (Explain if yes)                          No   Date  10/10/2022 Print Health Care Provider's   Alden Mike M.D. I certify the employer’s copy of  this form was mailed on:   Address  [address] Insurer’s Use Only     [facility] Zip  [address]    Provider’s Tax ID Number  [id number] Telephone  Dept: [phone]             Health Care Provider’s Original or Electronic Signature  e-ALDEN Richards M.D. Degree (MD,DO, DC,PA-C,APRN)   MD      * Complete and attach Release of Information (Form C-4A) when injured employee signs C-4 Form electronically  ORIGINAL - TREATING HEALTHCARE PROVIDER PAGE 2 - INSURER/TPA PAGE 3 - EMPLOYER PAGE 4 - EMPLOYEE             Form C-4 (rev.08/21)           BRIEF DESCRIPTION OF RIGHTS AND BENEFITS  (Pursuant to [place] 616C.050)    Notice of Injury or Occupational Disease (Incident Report Form C-1): If an injury or occupational disease (OD) arises out of and in the course of employment, you must provide written notice to your employer as soon as practicable, but no later than 7 days after the accident or OD. Your employer shall maintain a sufficient supply of the required forms.    Claim for Compensation (Form C-4): If medical treatment is sought, the form C-4 is available at the place of initial treatment. A completed \"Claim for Compensation\" (Form C-4) must be filed within 90 days after an accident or OD. The treating physician or chiropractor must, within 3 working days after treatment, complete and mail to the employer, the employer's insurer and third-party , the Claim for Compensation.    Medical Treatment: If you require medical treatment for your on-the-job injury or " OD, you may be required to select a physician or chiropractor from a list provided by your workers’ compensation insurer, if it has contracted with an Organization for Managed Care (MCO) or Preferred Provider Organization (PPO) or providers of health care. If your employer has not entered into a contract with an MCO or PPO, you may select a physician or chiropractor from the Panel of Physicians and Chiropractors. Any medical costs related to your industrial injury or OD will be paid by your insurer.    Temporary Total Disability (TTD): If your doctor has certified that you are unable to work for a period of at least 5 consecutive days, or 5 cumulative days in a 20-day period, or places restrictions on you that your employer does not accommodate, you may be entitled to TTD compensation.    Temporary Partial Disability (TPD): If the wage you receive upon reemployment is less than the compensation for TTD to which you are entitled, the insurer may be required to pay you TPD compensation to make up the difference. TPD can only be paid for a maximum of 24 months.    Permanent Partial Disability (PPD): When your medical condition is stable and there is an indication of a PPD as a result of your injury or OD, within 30 days, your insurer must arrange for an evaluation by a rating physician or chiropractor to determine the degree of your PPD. The amount of your PPD award depends on the date of injury, the results of the PPD evaluation, your age and wage.    Permanent Total Disability (PTD): If you are medically certified by a treating physician or chiropractor as permanently and totally disabled and have been granted a PTD status by your insurer, you are entitled to receive monthly benefits not to exceed 66 2/3% of your average monthly wage. The amount of your PTD payments is subject to reduction if you previously received a lump-sum PPD award.    Vocational Rehabilitation Services: You may be eligible for vocational  rehabilitation services if you are unable to return to the job due to a permanent physical impairment or permanent restrictions as a result of your injury or occupational disease.    Transportation and Per Fabrizio Reimbursement: You may be eligible for travel expenses and per fabrizio associated with medical treatment.    Reopening: You may be able to reopen your claim if your condition worsens after claim closure.     Appeal Process: If you disagree with a written determination issued by the insurer or the insurer does not respond to your request, you may appeal to the Department of Administration, , by following the instructions contained in your determination letter. You must appeal the determination within 70 days from the date of the determination letter at [address], [address], [place], [place] [address], or [address], [place], [place] [address]. If you disagree with the  decision, you may appeal to the Department of Administration, . You must file your appeal within 30 days from the date of the  decision letter at [address], [address], [place], [place] [address], or [address], [address], [place], [place] [address]. If you disagree with a decision of an , you may file a petition for judicial review with the District Court. You must do so within 30 days of the Appeal Officer’s decision. You may be represented by an  at your own expense or you may contact the [facility] for possible representation.    [place]  for Injured Workers (NAIW): If you disagree with a  decision, you may request that NAIW represent you without charge at an  Hearing. For information regarding denial of benefits, you may contact the [facility] at: [address], [address], [place] [address], [phone], or [address], [place] [address], [phone]  [phone]    To File a Complaint with the Division: If you wish to file a complaint with the  of the Division of Industrial Relations (DIR),  please contact the Workers’ Compensation Section, [address], [address], [place], [place] [address], telephone (643) [phone], or [address], [address], [place], [place] [address], telephone [phone].    For assistance with Workers’ Compensation Issues: You may contact the [facility] Office for Consumer Health Assistance, [address], [address], [place], [place] [address], Toll Free 1-541.174.6183, Web site: http://[facility].[place].gov/Programs/BETO E-mail: beto@[facility].[place].gov              __________________________________________________________________                                    _________________            Employee Name / Signature                                                                                                                            Date                                                                                                                                                                                                                              D-2 (rev. 10/20)

## 2022-10-14 ENCOUNTER — OCCUPATIONAL MEDICINE (OUTPATIENT)
Dept: URGENT CARE | Facility: PHYSICIAN GROUP | Age: 47
End: 2022-10-14
Payer: COMMERCIAL

## 2022-10-14 VITALS
BODY MASS INDEX: 34.55 KG/M2 | HEART RATE: 84 BPM | WEIGHT: 195 LBS | SYSTOLIC BLOOD PRESSURE: 116 MMHG | OXYGEN SATURATION: 98 % | RESPIRATION RATE: 16 BRPM | HEIGHT: 63 IN | DIASTOLIC BLOOD PRESSURE: 78 MMHG | TEMPERATURE: 98.6 F

## 2022-10-14 DIAGNOSIS — S05.02XA ABRASION OF LEFT CORNEA, INITIAL ENCOUNTER: ICD-10-CM
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
## 2022-10-14 PROCEDURE — 99213 OFFICE O/P EST LOW 20 MIN: CPT | Performed by: STUDENT IN AN ORGANIZED HEALTH CARE EDUCATION/TRAINING PROGRAM

## 2022-10-14 NOTE — LETTER
[facility]  [address]  [place] [address]  Phone:  [phone] - Fax:  [phone]   [facility] Progress Report and Disability Certification  Date of Service: 10/14/2022   No Show:  No  Date / Time of Next Visit:   discharged/MMI   Claim Information   Patient Name: Jass Feldman  Claim Number:     Employer: [employer]  Date of Injury: 10/10/2022     Insurer / TPA: [employer]  ID / SSN:     Occupation:   Diagnosis: Diagnoses of Abrasion of left cornea, subsequent encounter and Work related injury were pertinent to this visit.    Medical Information   Related to Industrial Injury? Yes    Subjective Complaints:  Pt presents for evaluation of an acute problem  DOI: 10/10/22   EVANGELINA: Cutting wood and felt something get into his eyes bilaterally   Having pain in both eyes   Pain is constant and not improving   Having some mild blurry vision  Has a headache  Eyes feel watery  No eye discharge  Eyes are somewhat red    Today's date: 10/14/2022  Patient reports he is no longer experiencing pain.  No issues with visual acuity.  Still using eyedrops.  No additional complaints or concerns.    Patient does not speak English and  was used to aid with the encounter.   Objective Findings: Gen: no acute distress, normal voice  Skin: dry, intact, moist mucosal membranes  Head: Atraumatic, normocephalic  Psych: normal affect, normal judgement, alert, awake  Eye: No conjunctival injection bilaterally.  Extraocular eye muscles intact.  Pupils equally round and reactive to light bilaterally without presence of photophobia        Pre-Existing Condition(s):     Assessment:   Condition Improved    Status: Discharged /  MMI  Permanent Disability:No    Plan:      Diagnostics:      Comments:       Disability Information   Status: Released to Full Duty    From:     Through:   Restrictions are:     Physical Restrictions   Sitting:     Standing:    Stooping:    Bending:      Squatting:    Walking:    Climbing:    Pushing:      Pulling:    Other:    Reaching Above Shoulder (L):   Reaching Above Shoulder (R):       Reaching Below Shoulder (L):    Reaching Below Shoulder (R):      Not to exceed Weight Limits   Carrying(hrs):   Weight Limit(lb):   Lifting(hrs):   Weight  Limit(lb):     Comments: Patient was discharged.  Instructed to continue taking eyedrops to completion.    Repetitive Actions   Hands: i.e. Fine Manipulations from Grasping:     Feet: i.e. Operating Foot Controls:     Driving / Operate Machinery:     Health Care Provider’s Original or Electronic Signature  Chandan Means D.O. Health Care Provider’s Original or Electronic Signature    Supa Wyatt MD         Clinic Name / Location: [address] [address] Clinic Phone Number: Dept: [phone]   Appointment Time: 4:00 Pm Visit Start Time: 4:35 PM   Check-In Time:  4:23 Pm Visit Discharge Time: 5:31 Pm    Original-Treating Physician or Chiropractor    Page 2-Insurer/TPA    Page 3-Employer    Page 4-Employee

## 2022-10-15 NOTE — PROGRESS NOTES
"Subjective:     Jass Feldman is a 46 y.o. male who presents for Follow-Up (Feels better still in pain last couple of days drops feel like its causing drainage. )      Pt presents for evaluation of an acute problem  DOI: 10/10/22   EVANGELINA: Cutting wood and felt something get into his eyes bilaterally   Having pain in both eyes   Pain is constant and not improving   Having some mild blurry vision  Has a headache  Eyes feel watery  No eye discharge  Eyes are somewhat red    Today's date: 10/14/2022  Patient reports he is no longer experiencing pain.  No issues with visual acuity.  Still using eyedrops.  No additional complaints or concerns.    Patient does not speak English and  was used to aid with the encounter.    PMH:   No pertinent past medical history to this problem  MEDS:  Medications were reviewed in EMR  ALLERGIES:  Allergies were reviewed in EMR  FH:   No pertinent family history to this problem       Objective:     /78   Pulse 84   Temp 37 °C (98.6 °F)   Resp 16   Ht 1.6 m (5' 3\")   Wt 88.5 kg (195 lb)   SpO2 98%   BMI 34.54 kg/m²     Gen: no acute distress, normal voice  Skin: dry, intact, moist mucosal membranes  Head: Atraumatic, normocephalic  Psych: normal affect, normal judgement, alert, awake  Eye: No conjunctival injection bilaterally.  Extraocular eye muscles intact.  Pupils equally round and reactive to light bilaterally without presence of photophobia         Assessment/Plan:       1. Abrasion of left cornea, subsequent encounter    2. Work related injury    Released to Full Duty FROM   TO    Patient was discharged.  Instructed to continue taking eyedrops to completion.       Differential diagnosis, natural history, supportive care, and indications for immediate follow-up discussed.    "

## 2023-10-06 ENCOUNTER — APPOINTMENT (OUTPATIENT)
Dept: URGENT CARE | Facility: CLINIC | Age: 48
End: 2023-10-06

## 2023-10-10 ENCOUNTER — NON-PROVIDER VISIT (OUTPATIENT)
Dept: URGENT CARE | Facility: CLINIC | Age: 48
End: 2023-10-10

## 2023-10-10 ENCOUNTER — OCCUPATIONAL MEDICINE (OUTPATIENT)
Dept: URGENT CARE | Facility: CLINIC | Age: 48
End: 2023-10-10
Payer: COMMERCIAL

## 2023-10-10 VITALS
RESPIRATION RATE: 17 BRPM | WEIGHT: 189.5 LBS | HEIGHT: 64 IN | OXYGEN SATURATION: 97 % | SYSTOLIC BLOOD PRESSURE: 110 MMHG | TEMPERATURE: 97.6 F | DIASTOLIC BLOOD PRESSURE: 78 MMHG | HEART RATE: 79 BPM | BODY MASS INDEX: 32.35 KG/M2

## 2023-10-10 DIAGNOSIS — Z02.1 PRE-EMPLOYMENT DRUG SCREENING: ICD-10-CM

## 2023-10-10 DIAGNOSIS — T14.8XXA PUNCTURE WOUND: ICD-10-CM

## 2023-10-10 LAB
AMP AMPHETAMINE: NEGATIVE
BREATH ALCOHOL COMMENT: NORMAL
COC COCAINE: NEGATIVE
INT CON NEG: NORMAL
INT CON POS: NORMAL
MET METHAMPHETAMINES: NEGATIVE
OPI OPIATES: NEGATIVE
PCP PHENCYCLIDINE: NEGATIVE
POC BREATHALIZER: 0 PERCENT (ref 0–0.01)
POC DRUG COMMENT 753798-OCCUPATIONAL HEALTH: NEGATIVE
THC: NEGATIVE

## 2023-10-10 PROCEDURE — 90471 IMMUNIZATION ADMIN: CPT | Performed by: PHYSICIAN ASSISTANT

## 2023-10-10 PROCEDURE — 3078F DIAST BP <80 MM HG: CPT | Performed by: PHYSICIAN ASSISTANT

## 2023-10-10 PROCEDURE — 82075 ASSAY OF BREATH ETHANOL: CPT | Performed by: NURSE PRACTITIONER

## 2023-10-10 PROCEDURE — 90715 TDAP VACCINE 7 YRS/> IM: CPT | Performed by: PHYSICIAN ASSISTANT

## 2023-10-10 PROCEDURE — 3074F SYST BP LT 130 MM HG: CPT | Performed by: PHYSICIAN ASSISTANT

## 2023-10-10 PROCEDURE — 99213 OFFICE O/P EST LOW 20 MIN: CPT | Mod: 25 | Performed by: PHYSICIAN ASSISTANT

## 2023-10-10 PROCEDURE — 80305 DRUG TEST PRSMV DIR OPT OBS: CPT | Performed by: NURSE PRACTITIONER

## 2023-10-10 RX ORDER — LORATADINE 10 MG/1
10 TABLET ORAL DAILY
COMMUNITY

## 2023-10-10 NOTE — LETTER
"EMPLOYEE’S CLAIM FOR COMPENSATION/ REPORT OF INITIAL TREATMENT  FORM C-4  PLEASE TYPE OR PRINT    EMPLOYEE’S CLAIM - PROVIDE ALL INFORMATION REQUESTED   First Name  Jass Last Name  John Feldman Birthdate                    1975                Sex  male Claim Number (Insurer’s Use Only)   Home Address  [address] Age  47 y.o. Height  1.62 m (5' 3.78\") Weight  86 kg (189 lb 8 oz) [facility]     [facility] Zip  [address] Telephone  [phone] (home)    Mailing Address  [address] [facility] Zip  [address] Primary Language Spoken  Khmer    INSURER   THIRD-PARTY     [facility]   Employee's Occupation (Job Title) When Injury or Occupational Disease Occurred  [employer]    Employer's Name/Company Name  [employer]  Telephone  [phone]    Office Mail Address (Number and Street)  [address]        Date of Injury  10/6/2023               Hours Injury  3:30 PM Date Employer Notified  10/6/2023 Last Day of Work after Injury or Occupational Disease  10/10/2023 Supervisor to Whom Injury     Reported  Wiley [place]   Address or Location of Accident (if applicable)  Work [1]   What were you doing at the time of accident? (if applicable)  Limpiando    How did this injury or occupational disease occur? (Be specific and answer in detail. Use additional sheet if necessary)  Limpiando leighton webster y cuando la arroje los clavos atosaron en miguante y mimano   If you believe that you have an occupational disease, when did you first have knowledge of the disability and its relationship to your employment?  n/a Witnesses to the Accident (if applicable)  n/a      Nature of Injury or Occupational Disease  Laceration  Part(s) of Body Injured or Affected  Hand (R), N/A, N/A    I CERTIFY THAT THE ABOVE IS TRUE AND CORRECT TO T HE BEST OF MY KNOWLEDGE AND THAT I HAVE PROVIDED THIS " INFORMATION IN ORDER TO OBTAIN THE BENEFITS OF [place], OR [place]).  I HEREBY AUTHORIZE ANY PHYSICIAN, CHIROPRACTOR, SURGEON, PRACTITIONER OR ANY OTHER PERSON, ANY HOSPITAL, INCLUDING [facility] OR [facility], ANY  MEDICAL SERVICE ORGANIZATION, ANY INSURANCE COMPANY, OR OTHER INSTITUTION OR ORGANIZATION TO RELEASE TO EACH OTHER, ANY MEDICAL OR OTHER INFORMATION, INCLUDING BENEFITS PAID OR PAYABLE, PERTINENT TO THIS INJURY OR DISEASE, EXCEPT INFORMATION RELATIVE TO DIAGNOSIS, TREATMENT AND/OR COUNSELING FOR AIDS, PSYCHOLOGICAL CONDITIONS, ALCOHOL OR CONTROLLED SUBSTANCES, FOR WHICH I MUST GIVE SPECIFIC AUTHORIZATION.  A PHOTOSTAT OF THIS AUTHORIZATION SHALL BE VALID AS THE ORIGINAL.       Date 10/10/2023   [facility]  Employee’s Original or  *Electronic Signature   THIS REPORT MUST BE COMPLETED AND MAILED WITHIN 3 WORKING DAYS OF TREATMENT   Place  [facility]  Name of Facility  [facility]   Date  10/10/2023 Diagnosis and Description of Injury or Occupational Disease  (T14.8XXA) Puncture wound Is there evidence that the injured employee was under the influence of alcohol and/or another controlled substance at the time of accident?  ? No ? Yes (if yes, please explain)   Hour  11:35 AM   The encounter diagnosis was Puncture wound. No   Treatment  Full duty, TDAP updated, no evidence of further injury or infection, case closed MMI today   Have you advised the patient to remain off work five days or     more?    X-Ray Findings      ? Yes Indicate dates:   From   To      From information given by the employee, together with medical evidence, can        you directly connect this injury or occupational disease as job incurred?  Yes ? No If no, is the injured employee capable of:  ? full duty  Yes ? modified duty      Is additional medical care by a physician indicated?  No If modified  "duty, specify any limitations / restrictions      Do you know of any previous injury or disease contributing to this condition or occupational disease?  ? Yes ? No (Explain if yes)                          No   Date  10/10/2023 Print Health Care Provider's  Name  Alireza Hightower P.A.-C. I certify that the employer’s copy of  this form was delivered to the employer on:   Address  [address] [place] [address] [address] Insurer’s Use Only     [facility]  [address]    Provider’s Tax ID Number  [id number] Telephone  Dept: [phone]             Health Care Provider’s Original or Electronic Signature  e-ALIREZA Arguelles P.A.-C. Degree (MD,DO, DC,PA-C,APRN)  MACKENZIE      * Complete and attach Release of Information (Form C-4A) when injured employee signs C-4 Form electronically  ORIGINAL - TREATING HEALTHCARE PROVIDER PAGE 2 - INSURER/TPA PAGE 3 - EMPLOYER PAGE 4 - EMPLOYEE             Form C-4 (rev.08/21)           BRIEF DESCRIPTION OF RIGHTS AND BENEFITS  (Pursuant to [place])    Notice of Injury or Occupational Disease (Incident Report Form C-1): If an injury or occupational disease (OD) arises out of and in the course of employment, you must provide written notice to your employer as soon as practicable, but no later than 7 days after the accident or OD. Your employer shall maintain a sufficient supply of the required forms.    Claim for Compensation (Form C-4): If medical treatment is sought, the form C-4 is available at the place of initial treatment. A completed \"Claim for Compensation\" (Form C-4) must be filed within 90 days after an accident or OD. The treating physician or chiropractor must, within 3 working days after treatment, complete and mail to the employer, the employer's insurer and third-party , the Claim for Compensation.    Medical Treatment: If you require medical treatment for your on-the-job injury or OD, you may be required to select a physician or chiropractor from a list " provided by your workers’ compensation insurer, if it has contracted with an Organization for Managed Care (MCO) or Preferred Provider Organization (PPO) or providers of health care. If your employer has not entered into a contract with an MCO or PPO, you may select a physician or chiropractor from the Panel of Physicians and Chiropractors. Any medical costs related to your industrial injury or OD will be paid by your insurer.    Temporary Total Disability (TTD): If your doctor has certified that you are unable to work for a period of at least 5 consecutive days, or 5 cumulative days in a 20-day period, or places restrictions on you that your employer does not accommodate, you may be entitled to TTD compensation.    Temporary Partial Disability (TPD): If the wage you receive upon reemployment is less than the compensation for TTD to which you are entitled, the insurer may be required to pay you TPD compensation to make up the difference. TPD can only be paid for a maximum of 24 months.    Permanent Partial Disability (PPD): When your medical condition is stable and there is an indication of a PPD as a result of your injury or OD, within 30 days, your insurer must arrange for an evaluation by a rating physician or chiropractor to determine the degree of your PPD. The amount of your PPD award depends on the date of injury, the results of the PPD evaluation, your age and wage.    Permanent Total Disability (PTD): If you are medically certified by a treating physician or chiropractor as permanently and totally disabled and have been granted a PTD status by your insurer, you are entitled to receive monthly benefits not to exceed 66 2/3% of your average monthly wage. The amount of your PTD payments is subject to reduction if you previously received a lump-sum PPD award.    Vocational Rehabilitation Services: You may be eligible for vocational rehabilitation services if you are unable to return to the job due to a  permanent physical impairment or permanent restrictions as a result of your injury or occupational disease.    Transportation and Per Fabrizio Reimbursement: You may be eligible for travel expenses and per fabrizio associated with medical treatment.    Reopening: You may be able to reopen your claim if your condition worsens after claim closure.     Appeal Process: If you disagree with a written determination issued by the insurer or the insurer does not respond to your request, you may appeal to the Department of Administration, , by following the instructions contained in your determination letter. You must appeal the determination within 70 days from the date of the determination letter at [address], [address], [place], [place] [address], or [address], [address], [place] [address]. If you disagree with the  decision, you may appeal to the Department of Administration, . You must file your appeal within 30 days from the date of the  decision letter at [address], [address], [place], [place] [address], or [address], [place] [address]. If you disagree with a decision of an , you may file a petition for judicial review with the District Court. You must do so within 30 days of the Appeal Officer’s decision. You may be represented by an  at your own expense or you may contact the [facility] for possible representation.    [place]  for Injured Workers (NAIW): If you disagree with a  decision, you may request that NAIW represent you without charge at an  Hearing. For information regarding denial of benefits, you may contact the [facility] at: [address], [address], [place] [address], [phone] [phone], or [address], [place] [address], [phone]    To File a Complaint with the Division: If you wish to file a  complaint with the  of the Division of Industrial Relations (DIR),  please contact the Workers’ Compensation Section, [address], [address], [place], [place] [address], telephone ([phone]) [phone], or [address], [place], [place] [address], telephone [phone].    For assistance with Workers’ Compensation Issues: You may contact the [facility] Office for Consumer Health Assistance, [address], [address], [place], [place] [address], Toll Free 1-923.235.6802, Web site: http://[facility].[place].gov/Programs/BETO E-mail: beto@[facility].nv.[facility]              __________________________________________________________________                                    _________________            Employee Name / Signature                                                                                                                            Date                                                                                                                                                                                                                              D-2 (rev. 10/20)

## 2023-10-10 NOTE — PROGRESS NOTES
"Subjective:     Jass Feldman is a 47 y.o. male who presents for Hand Injury (Workers comp hand injury. Patient states that 2 nails went into his skin. )      DOI: 10/6/23 - Pt notes injury to right hand that occurred a few days ago at work.  He describes puncture wounds to right hand from a board that had exposed nails poking through.  Tdap up-to-date through 2026 but he and employer would like to update his TDAP today. Pt is right hand dominant. Has tried cleaning wound. Denies numbness or tingling. Notes mild discomfort.     PMH:   No pertinent past medical history to this problem  MEDS:  Medications were reviewed in EMR  ALLERGIES:  Allergies were reviewed in EMR  FH:   No pertinent family history to this problem       Objective:     /78 (BP Location: Left arm, Patient Position: Sitting, BP Cuff Size: Adult)   Pulse 79   Temp 36.4 °C (97.6 °F) (Temporal)   Resp 17   Ht 1.62 m (5' 3.78\")   Wt 86 kg (189 lb 8 oz)   SpO2 97%   BMI 32.75 kg/m²     Gen: AOx3; Head: NC AT; Eyes: PERRLA/EOM; Lungs: NLR; Cardiac: RR by periph pulse exam; Right hand: notes scabbed lesions to right hand dorsum c/w puncture wound, no associated edema or erythema, no effusions, full AROM; Neuro: NVID, brisk cap refill, normal sensation and strength    Tdap updated patient tolerates well    Assessment/Plan:       1. Puncture wound  - Tdap =>6yo IM    Other orders  - loratadine (CLARITIN) 10 MG Tab; Take 10 mg by mouth every day.    Released to Full Duty FROM   TO    Full duty, TDAP updated, no evidence of further injury or infection, case closed MMI today   Full duty, TDAP updated, no evidence of further injury or infection, case closed MMI today     Differential diagnosis, natural history, supportive care, and indications for immediate follow-up discussed.    "

## 2023-10-10 NOTE — LETTER
[facility] [place] [address] [facility] [address] [place] [address]  Phone:  [phone] - Fax:  [phone]   [facility] Progress Report and Disability Certification  Date of Service: 10/10/2023   No Show:  No  Date / Time of Next Visit:     Claim Information   Patient Name: Jass Feldman  Claim Number:     Employer: [employer]  Date of Injury: 10/6/2023     Insurer / TPA: [employer]  ID / SSN:     Occupation: [employer]  Diagnosis: The encounter diagnosis was Puncture wound.    Medical Information   Related to Industrial Injury? Yes    Subjective Complaints:  DOI: 10/6/23 - Pt notes injury to right hand that occurred a few days ago at work.  He describes puncture wounds to right hand from a board that had exposed nails poking through.  Tdap up-to-date through 2026 but he and employer would like to update his TDAP today. Pt is right hand dominant. Has tried cleaning wound. Denies numbness or tingling. Notes mild discomfort.    Objective Findings: Gen: AOx3; Head: NC AT; Eyes: PERRLA/EOM; Lungs: NLR; Cardiac: RR by periph pulse exam; Right hand: notes scabbed lesions to right hand dorsum c/w puncture wound, no associated edema or erythema, no effusions, full AROM; Neuro: NVID, brisk cap refill, normal sensation and strength   Pre-Existing Condition(s):     Assessment:   Initial Visit    Status: Discharged /  MMI  Permanent Disability:No    Plan:   Comments:Full duty, TDAP updated, no evidence of further injury or infection, case closed MMI today    Diagnostics:      Comments:  Full duty, TDAP updated, no evidence of further injury or infection, case closed MMI today     Disability Information   Status: Released to Full Duty    From:     Through:   Restrictions are:     Physical Restrictions   Sitting:    Standing:    Stooping:    Bending:      Squatting:    Walking:    Climbing:    Pushing:      Pulling:    Other:    Reaching Above Shoulder (L):    Reaching Above Shoulder (R):       Reaching Below Shoulder (L):    Reaching Below Shoulder (R):      Not to exceed Weight Limits   Carrying(hrs):   Weight Limit(lb):   Lifting(hrs):   Weight  Limit(lb):     Comments: Full duty, TDAP updated, no evidence of further injury or infection, case closed MMI today     Repetitive Actions   Hands: i.e. Fine Manipulations from Grasping:     Feet: i.e. Operating Foot Controls:     Driving / Operate Machinery:     Health Care Provider’s Original or Electronic Signature  Alireza Hightower P.A.-C. Health Care Provider’s Original or Electronic Signature    Jasper Gamboa DO MPH     Clinic Name / Location: [place] [address]  [place] [address] Clinic Phone Number: Dept: [phone]   Appointment Time: 10:30 Am Visit Start Time: 11:35 AM   Check-In Time:  10:27 Am Visit Discharge Time:  12:02 PM   Original-Treating Physician or Chiropractor    Page 2-Insurer/TPA    Page 3-Employer    Page 4-Employee

## (undated) DEVICE — TUBING CLEARLINK DUO-VENT - C-FLO (48EA/CA)

## (undated) DEVICE — KIT ROOM DECONTAMINATION

## (undated) DEVICE — SET EXTENSION WITH 2 PORTS (48EA/CA) ***PART #2C8610 IS A SUBSTITUTE*****

## (undated) DEVICE — BRIEF STRETCH MATERNITY M/L - FITS 20-60IN (5EA/BG 20BG/CA)

## (undated) DEVICE — WATER IRRIG. STER. 1500 ML - (9/CA)

## (undated) DEVICE — HEAD HOLDER JUNIOR/ADULT

## (undated) DEVICE — PAD LAP STERILE 18 X 18 - (5/PK 40PK/CA)

## (undated) DEVICE — PROTECTOR ULNA NERVE - (36PR/CA)

## (undated) DEVICE — NEPTUNE 4 PORT MANIFOLD - (20/PK)

## (undated) DEVICE — SUCTION INSTRUMENT YANKAUER BULBOUS TIP W/O VENT (50EA/CA)

## (undated) DEVICE — TIP INTPLS HFLO ML ORFC BTRY - (12/CS)  FOR SURGILAV

## (undated) DEVICE — LACTATED RINGERS INJ 1000 ML - (14EA/CA 60CA/PF)

## (undated) DEVICE — GLOVE BIOGEL SZ 6 PF LATEX - (50EA/BX 4BX/CA)

## (undated) DEVICE — ELECTRODE 850 FOAM ADHESIVE - HYDROGEL RADIOTRNSPRNT (50/PK)

## (undated) DEVICE — DRESSING ABDOMINAL PAD STERILE 8 X 10" (360EA/CA)"

## (undated) DEVICE — Device

## (undated) DEVICE — GLOVE BIOGEL SZ 6.5 SURGICAL PF LTX (50PR/BX 4BX/CA)

## (undated) DEVICE — GOWN WARMING STANDARD FLEX - (30/CA)

## (undated) DEVICE — JELLY, KY 2 0Z STERILE

## (undated) DEVICE — BLADE SURGICAL #15 - (50/BX 3BX/CA)

## (undated) DEVICE — MASK AIRWAY SIZE 3 UNIQUE SILICON (10/BX)

## (undated) DEVICE — GLOVE BIOGEL INDICATOR SZ 7SURGICAL PF LTX - (50/BX 4BX/CA)

## (undated) DEVICE — GLOVE BIOGEL ECLIPSE PF LATEX SIZE 9.0

## (undated) DEVICE — GLOVE BIOGEL ECLIPSE  PF LATEX SIZE 6.5 (50PR/BX)

## (undated) DEVICE — HANDPIECE 10FT INTPLS SCT PLS IRRIGATION HAND CONTROL SET (6/PK)

## (undated) DEVICE — SPONGE GAUZESTER 4 X 4 4PLY - (128PK/CA)

## (undated) DEVICE — SET LEADWIRE 5 LEAD BEDSIDE DISPOSABLE ECG (1SET OF 5/EA)

## (undated) DEVICE — CHLORAPREP 26 ML APPLICATOR - ORANGE TINT(25/CA)

## (undated) DEVICE — SENSOR SPO2 NEO LNCS ADHESIVE (20/BX) SEE USER NOTES

## (undated) DEVICE — GAUZE PACKING STRIP STERILE IODOFORM 1/2 IN X 5 YDS

## (undated) DEVICE — DETERGENT RENUZYME PLUS 10 OZ PACKET (50/BX)

## (undated) DEVICE — MASK ANESTHESIA ADULT  - (100/CA)

## (undated) DEVICE — CANISTER SUCTION 3000ML MECHANICAL FILTER AUTO SHUTOFF MEDI-VAC NONSTERILE LF DISP  (40EA/CA)

## (undated) DEVICE — KIT ANESTHESIA W/CIRCUIT & 3/LT BAG W/FILTER (20EA/CA)